# Patient Record
Sex: FEMALE | Race: WHITE
[De-identification: names, ages, dates, MRNs, and addresses within clinical notes are randomized per-mention and may not be internally consistent; named-entity substitution may affect disease eponyms.]

---

## 2021-07-30 ENCOUNTER — NURSE TRIAGE (OUTPATIENT)
Dept: OTHER | Facility: CLINIC | Age: 61
End: 2021-07-30

## 2021-07-30 NOTE — TELEPHONE ENCOUNTER
Reason for Disposition   Patient sounds very sick or weak to the triager    Answer Assessment - Initial Assessment Questions  1. APPEARANCE: \"What does the rash look like? \"       Red \"welts\", \"blistery\"    2. LOCATION: \"Where is the rash located? \"        From ankles up legs to the buttocks and to her back and inside of forearms    3. NUMBER: \"How many hives are there? \"       Multiple    4. SIZE: \"How big are the hives? \" (inches, cm, compare to coins) \"Do they all look the same or is there lots of variation in shape and size? \"       All different sizes     5. ONSET: \"When did the hives begin? \" (Hours or days ago)       Started blistering last night     6. ITCHING: \"Does it itch? \" If so, ask: \"How bad is the itch? \"     - MILD: doesn't interfere with normal activities    - MODERATE-SEVERE: interferes with work, school, sleep, or other activities       It hurts more than itches    7. RECURRENT PROBLEM: \"Have you had hives before? \" If so, ask: \"When was the last time? \" and \"What happened that time? \"       Has had hives since July 9 or 10 but never this bad - was treated with prednisone in the past.    8. TRIGGERS: \"Were you exposed to any new food, plant, cosmetic product or animal just before the hives began? \"      She believes it may be peanuts b/c she popped open peanuts last night about 7pm or 8pm - then noticed hives last night. 9. OTHER SYMPTOMS: \"Do you have any other symptoms? \" (e.g., fever, tongue swelling, difficulty breathing, abdominal pain)      Used Aveeno itch cream . Took 2 benedryl this afternoon. Also took cold shower. Burning pain rated 9/10. Denies sob. But her knee joints are getting sore. No tongue swelling . No abd pain now but felt nauseated at work. Inner thighs of legs are swelling . 10. PREGNANCY: \"Is there any chance you are pregnant? \" \"When was your last menstrual period? \"        Not asked    Protocols used: HIVES-ADULT-    Brief description of triage: see above    Triage

## 2023-03-10 DIAGNOSIS — I10 ESSENTIAL HYPERTENSION: ICD-10-CM

## 2023-03-10 RX ORDER — VERAPAMIL HYDROCHLORIDE 120 MG/1
120 TABLET, FILM COATED, EXTENDED RELEASE ORAL
Qty: 90 TABLET | Refills: 3 | Status: SHIPPED | OUTPATIENT
Start: 2023-03-10 | End: 2023-04-21 | Stop reason: SDUPTHER

## 2023-03-10 RX ORDER — VERAPAMIL HYDROCHLORIDE 120 MG/1
120 TABLET, FILM COATED, EXTENDED RELEASE ORAL
COMMUNITY
Start: 2015-03-23 | End: 2023-03-10 | Stop reason: SDUPTHER

## 2023-04-12 DIAGNOSIS — G57.60 MORTON'S NEUROMA, UNSPECIFIED LATERALITY: ICD-10-CM

## 2023-04-12 DIAGNOSIS — G25.81 RESTLESS LEG: Primary | ICD-10-CM

## 2023-04-12 PROBLEM — Z11.52 ENCOUNTER FOR SCREENING LABORATORY TESTING FOR COVID-19 VIRUS: Status: ACTIVE | Noted: 2023-04-12

## 2023-04-12 PROBLEM — E66.9 OBESITY (BMI 30.0-34.9): Status: ACTIVE | Noted: 2023-04-12

## 2023-04-12 PROBLEM — E66.811 OBESITY (BMI 30.0-34.9): Status: ACTIVE | Noted: 2023-04-12

## 2023-04-12 PROBLEM — K51.90 ULCERATIVE COLITIS (MULTI): Status: ACTIVE | Noted: 2023-04-12

## 2023-04-12 PROBLEM — F41.9 ANXIETY DISORDER: Status: ACTIVE | Noted: 2023-04-12

## 2023-04-12 PROBLEM — U07.1 COVID-19: Status: ACTIVE | Noted: 2023-04-12

## 2023-04-12 PROBLEM — E78.5 HYPERLIPIDEMIA: Status: ACTIVE | Noted: 2023-04-12

## 2023-04-12 PROBLEM — G47.33 OSA (OBSTRUCTIVE SLEEP APNEA): Status: ACTIVE | Noted: 2023-04-12

## 2023-04-12 PROBLEM — K44.9 HIATAL HERNIA: Status: ACTIVE | Noted: 2023-04-12

## 2023-04-12 PROBLEM — M54.50 LOW BACK PAIN: Status: ACTIVE | Noted: 2023-04-12

## 2023-04-12 PROBLEM — K21.9 GERD (GASTROESOPHAGEAL REFLUX DISEASE): Status: ACTIVE | Noted: 2023-04-12

## 2023-04-12 PROBLEM — A63.0 GENITAL WARTS: Status: ACTIVE | Noted: 2023-04-12

## 2023-04-12 RX ORDER — CARBIDOPA AND LEVODOPA 25; 100 MG/1; MG/1
1 TABLET ORAL NIGHTLY
COMMUNITY
End: 2023-04-12 | Stop reason: SDUPTHER

## 2023-04-12 RX ORDER — CARBIDOPA AND LEVODOPA 25; 100 MG/1; MG/1
TABLET ORAL
Qty: 90 TABLET | Refills: 3 | Status: SHIPPED | OUTPATIENT
Start: 2023-04-12 | End: 2024-04-09

## 2023-04-21 DIAGNOSIS — I10 ESSENTIAL HYPERTENSION: ICD-10-CM

## 2023-04-21 RX ORDER — VERAPAMIL HYDROCHLORIDE 120 MG/1
120 TABLET, FILM COATED, EXTENDED RELEASE ORAL
Qty: 90 TABLET | Refills: 3 | Status: SHIPPED | OUTPATIENT
Start: 2023-04-21 | End: 2023-05-12 | Stop reason: SDUPTHER

## 2023-04-24 ENCOUNTER — TELEPHONE (OUTPATIENT)
Dept: PRIMARY CARE | Facility: CLINIC | Age: 63
End: 2023-04-24
Payer: COMMERCIAL

## 2023-04-24 DIAGNOSIS — R92.8 ABNORMAL MAMMOGRAM OF LEFT BREAST: Primary | ICD-10-CM

## 2023-05-12 ENCOUNTER — TELEPHONE (OUTPATIENT)
Dept: PRIMARY CARE | Facility: CLINIC | Age: 63
End: 2023-05-12
Payer: COMMERCIAL

## 2023-05-12 DIAGNOSIS — I10 ESSENTIAL HYPERTENSION: ICD-10-CM

## 2023-05-12 RX ORDER — VERAPAMIL HYDROCHLORIDE 120 MG/1
180 TABLET, FILM COATED, EXTENDED RELEASE ORAL
Qty: 135 TABLET | Refills: 0 | Status: SHIPPED | OUTPATIENT
Start: 2023-05-12 | End: 2023-06-12 | Stop reason: SDUPTHER

## 2023-05-12 NOTE — TELEPHONE ENCOUNTER
Mild noncancerous looking changes in breatsts    Will keep a close eye since her mom had breast cancer     Recheck in 6 mo

## 2023-05-16 DIAGNOSIS — M54.50 MIDLINE LOW BACK PAIN, UNSPECIFIED CHRONICITY, UNSPECIFIED WHETHER SCIATICA PRESENT: Primary | ICD-10-CM

## 2023-05-16 RX ORDER — CYCLOBENZAPRINE HCL 5 MG
1 TABLET ORAL NIGHTLY PRN
COMMUNITY
Start: 2015-10-05 | End: 2023-05-16 | Stop reason: SDUPTHER

## 2023-05-16 RX ORDER — CYCLOBENZAPRINE HCL 5 MG
TABLET ORAL
Qty: 90 TABLET | Refills: 3 | Status: SHIPPED | OUTPATIENT
Start: 2023-05-16

## 2023-05-18 NOTE — TELEPHONE ENCOUNTER
Mammo US reviewed,   order placed for dx mammo and US L breast for L breast mass 11-10-22 at [facility]   
Need a copy of the results of her most recent mammo report to know what code to place/ type of test     Please call and get if poss,    otherwise marilynn can bring us a copy    (there is no care everywhere for her and its not in community record or in [employer])    Happy to order once we have the record     
Orders were faxed this morning to [facility]  
PT was notified by [facility] it is time to schedule her 6 mo re-evaluation  of mammogram - asked that it be sent to [facility] FAX# [phone]  
Records pulled, given to Dr. Sherwood  
No

## 2023-06-02 ENCOUNTER — NURSE TRIAGE (OUTPATIENT)
Dept: OTHER | Facility: CLINIC | Age: 63
End: 2023-06-02

## 2023-06-02 NOTE — TELEPHONE ENCOUNTER
Writer called patient back to follow up on 911 disposition, NA, went to , left message to call MMO back with any further questions or needs.

## 2023-06-02 NOTE — TELEPHONE ENCOUNTER
Location of patient: [place]    Subjective: Caller states \"I'm having pain in my chest, starts at left breast and radiates to the right. \"     Current Symptoms: chest pain x 10-15 mins; HR-65-69; 02- 97% (smart watch); dizzy, sweating, left arm tingling    PMH: HTN    Denies any difficulty breathing, injury, N/V,     Onset: 15 minutes ago; sudden    Associated Symptoms: reduced activity    Pain Severity: unable to assess    Temperature: unknown    What has been tried: na    LMP: NA Pregnant: NA    Recommended disposition: Call  Now    Care advice provided, patient verbalizes understanding; denies any other questions or concerns; instructed to call back for any new or worsening symptoms. Patient/caller agrees to calling 911    This triage is a result of a call to [address] [place] [address]. Please do not respond to the triage nurse through this encounter. Any subsequent communication should be directly with the patient.     Reason for Disposition   [1] Chest pain lasts > 5 minutes AND [2] age > 40    Protocols used: Chest Pain-ADULT-

## 2023-06-12 ENCOUNTER — OFFICE VISIT (OUTPATIENT)
Dept: PRIMARY CARE | Facility: CLINIC | Age: 63
End: 2023-06-12
Payer: COMMERCIAL

## 2023-06-12 VITALS
HEIGHT: 62 IN | TEMPERATURE: 98 F | SYSTOLIC BLOOD PRESSURE: 113 MMHG | OXYGEN SATURATION: 92 % | DIASTOLIC BLOOD PRESSURE: 69 MMHG | RESPIRATION RATE: 14 BRPM | HEART RATE: 86 BPM | BODY MASS INDEX: 32.5 KG/M2 | WEIGHT: 176.6 LBS

## 2023-06-12 DIAGNOSIS — R41.3 POOR MEMORY: Primary | ICD-10-CM

## 2023-06-12 DIAGNOSIS — K51.919 ULCERATIVE COLITIS WITH COMPLICATION, UNSPECIFIED LOCATION (MULTI): ICD-10-CM

## 2023-06-12 DIAGNOSIS — Z00.00 HEALTHCARE MAINTENANCE: ICD-10-CM

## 2023-06-12 DIAGNOSIS — E66.9 OBESITY (BMI 30.0-34.9): ICD-10-CM

## 2023-06-12 DIAGNOSIS — G25.81 RESTLESS LEGS SYNDROME: ICD-10-CM

## 2023-06-12 DIAGNOSIS — I10 ESSENTIAL HYPERTENSION: ICD-10-CM

## 2023-06-12 DIAGNOSIS — F40.9 PHOBIA, UNSPECIFIED TYPE: ICD-10-CM

## 2023-06-12 PROCEDURE — 99396 PREV VISIT EST AGE 40-64: CPT | Performed by: FAMILY MEDICINE

## 2023-06-12 PROCEDURE — 1036F TOBACCO NON-USER: CPT | Performed by: FAMILY MEDICINE

## 2023-06-12 PROCEDURE — 3078F DIAST BP <80 MM HG: CPT | Performed by: FAMILY MEDICINE

## 2023-06-12 PROCEDURE — 3074F SYST BP LT 130 MM HG: CPT | Performed by: FAMILY MEDICINE

## 2023-06-12 RX ORDER — FERROUS SULFATE 325(65) MG
TABLET ORAL
COMMUNITY

## 2023-06-12 RX ORDER — RABEPRAZOLE SODIUM 20 MG/1
1 TABLET, DELAYED RELEASE ORAL DAILY
COMMUNITY
Start: 2021-07-20 | End: 2023-12-13 | Stop reason: WASHOUT

## 2023-06-12 RX ORDER — MESALAMINE 800 MG/1
TABLET, DELAYED RELEASE ORAL
COMMUNITY
Start: 2014-02-11

## 2023-06-12 RX ORDER — ZINC GLUCONATE 50 MG
TABLET ORAL
COMMUNITY

## 2023-06-12 RX ORDER — PANTOPRAZOLE SODIUM 40 MG/1
TABLET, DELAYED RELEASE ORAL
COMMUNITY
Start: 2023-04-20

## 2023-06-12 RX ORDER — ACETAMINOPHEN 500 MG
TABLET ORAL
COMMUNITY

## 2023-06-12 RX ORDER — CITALOPRAM 20 MG/1
1 TABLET, FILM COATED ORAL DAILY
COMMUNITY
Start: 2014-03-31 | End: 2023-11-01 | Stop reason: SDUPTHER

## 2023-06-12 RX ORDER — VERAPAMIL HYDROCHLORIDE 180 MG/1
180 TABLET, FILM COATED, EXTENDED RELEASE ORAL
Qty: 90 TABLET | Refills: 3 | Status: SHIPPED | OUTPATIENT
Start: 2023-06-12 | End: 2024-05-20

## 2023-06-12 RX ORDER — CYCLOSPORINE 0 G/ML
SOLUTION/ DROPS OPHTHALMIC; TOPICAL
COMMUNITY
Start: 2022-04-12 | End: 2023-12-13 | Stop reason: WASHOUT

## 2023-06-12 ASSESSMENT — SLU MENTAL STATUS EXAMINATION (SLUMS)
BACKWARD DIGIT SPAN: 1
PICK OUT TRIANGLE: 2
WHAT STATE ARE WE IN: 1
DRAW A CLOCK: 4
WHAT YEAR IS THIS: 1
QUESTIONS ABOUT STORY: 2
WHAT DAY OF THE WEEK IS TODAY: 1
CALCULATE MONEY SPENT AND MONEY LEFT: 3
PROVIDE NAMES OF ANIMALS: 3
PATIENT HAS COMPLETED HIGH SCHOOL OR ABOVE: YES

## 2023-06-12 NOTE — PROGRESS NOTES
"Subjective   Patient ID: Coty Vidal is a 62 y.o. female who presents for annual physical/wellness visit.    She signed document informing that if problem issues also address at today's wellness visit that insurance may be appropriately billed so co-pay and deductible out of pocket expenses may occur.    Colorectal cancer screen: 11/11/2023  Mammogram: 04/26/2023 US done in 05/2023   Pap: 03/15/2023   Last Menstrual Period: unknow-jacquelyn   Tdap: 10/1/2020  HIV screen:  Hepatitis C screen:  Hepatitis B vaccine:    HPI     Here for cpe      See info above for what's due     Pt very active at job      does the cooking and buying of food,   not the healthiest eater     Room for improvement in nutrition    Mood stable, depr anx doing well on celexa     Rls doing well on sinemet         Review of Systems    Objective   /69 (BP Location: Right arm, Patient Position: Sitting, BP Cuff Size: Adult)   Pulse 86   Temp 36.7 °C (98 °F) (Temporal)   Resp 14   Ht 1.562 m (5' 1.5\")   Wt 80.1 kg (176 lb 9.6 oz)   LMP  (LMP Unknown)   SpO2 92%   BMI 32.83 kg/m²     Physical Exam  Constitutional:       General: She is not in acute distress.     Appearance: Normal appearance.   Cardiovascular:      Rate and Rhythm: Normal rate and regular rhythm.      Heart sounds: Normal heart sounds.   Pulmonary:      Effort: Pulmonary effort is normal.      Breath sounds: Normal breath sounds. No wheezing, rhonchi or rales.   Abdominal:      Palpations: Abdomen is soft.      Tenderness: There is no abdominal tenderness. There is no guarding or rebound.   Musculoskeletal:      Right lower leg: No edema.      Left lower leg: No edema.   Neurological:      Mental Status: She is alert.   Psychiatric:         Mood and Affect: Mood normal.             Assessment/Plan   Problem List Items Addressed This Visit       Essential hypertension    Relevant Medications    verapamil SR (Calan-SR) 180 mg ER tablet    Anxiety disorder     Add in " "exercise,  work on a healthy diet,   see meditation and counseling rec's below          Obesity (BMI 30.0-34.9)     We recommend limitation of refined carbohydrates such as pasta, pretzels, breads, , sugar -sweetened foods or beverages, alcohol, pastries, cereals, or bagels.   We do recommend eating lots of vegetables- 5 -7 servings of veggies daily. Eat lean proteins, and some fruits.  Eat small amounts of healthy fat daily, such as a handful of almonds, walnuts, pumpkin seeds, a serving of salmon, a splash of olive oil on your salad, an avocado.       Healthy nutritional choices decrease conditions like elevated cholesterol, elevated sugars, elevated weight, elevated blood pressure, elevated inflammation.    Healthy choices can also lower risk of events such as strokes, heart attacks, and cancer.     Make most of your food intake plant- based.   Have occasional treats if you like, but try not to overindulge.     Some sort of heart-rate increasing movement or exercise is recommended 4 - 6 days per week, even if in 5 or 10 min increments  several times throughout the day.            Restless legs syndrome    Ulcerative colitis (CMS/HCC)    Poor memory - Primary     Labs, SLUMS conducted   was a 23          Relevant Orders    TSH with reflex to Free T4 if abnormal    Vitamin B12     Other Visit Diagnoses       Healthcare maintenance        Relevant Orders    Lipid Panel    Comprehensive Metabolic Panel    CBC and Auto Differential          Tips for your general wellness...;  Remember importance of daily aerobic exercise for 30minutes to help with both your physical and mental/emotional health.  ;  Take time twice a day to relax with focused breathing and relaxation.  A good source to learn \"mindfulness\" relaxation is a book \"Mindfulness for Beginners\" by Naren Gamino.  ;    Strive for healthy eating with plenty of water, fruits, vegetables, and choosing as much plant based diet as able  If do consume non plant " protein, choose fish high in omega (like salmon or cod), skinless poultry, and rarely anything from a cow  Avoid processed foods.  ;  Shop the perimeter of the grocery store  - not the inner aisles where all the boxed, bagged, and canned process non natural foods are   Avoid sugar - including fruit juices with added sugar and avoid artificial sweeteners (sucralose, aspartame).; if want to use sweetener, use stevia (natural plant based non caloric sweetener)  Recommended guided nutrition plans include Mediterranean Diet - online resources available     Get plenty of sleep nightly - 7 hours minimum;    Exercise 150min per week;    Do not use tobacco    Abstain or limit alcohol to 1-2 drinks per 24 hours    See your dentist at least yearly    Have an eye check at least every 5 years    Follow up 1 year for annual wellness checkup;

## 2023-06-25 NOTE — ASSESSMENT & PLAN NOTE
We recommend limitation of refined carbohydrates such as pasta, pretzels, breads, , sugar -sweetened foods or beverages, alcohol, pastries, cereals, or bagels.   We do recommend eating lots of vegetables- 5 -7 servings of veggies daily. Eat lean proteins, and some fruits.  Eat small amounts of healthy fat daily, such as a handful of almonds, walnuts, pumpkin seeds, a serving of salmon, a splash of olive oil on your salad, an avocado.       Healthy nutritional choices decrease conditions like elevated cholesterol, elevated sugars, elevated weight, elevated blood pressure, elevated inflammation.    Healthy choices can also lower risk of events such as strokes, heart attacks, and cancer.     Make most of your food intake plant- based.   Have occasional treats if you like, but try not to overindulge.     Some sort of heart-rate increasing movement or exercise is recommended 4 - 6 days per week, even if in 5 or 10 min increments  several times throughout the day.

## 2023-11-01 DIAGNOSIS — F40.9 PHOBIA, UNSPECIFIED TYPE: ICD-10-CM

## 2023-11-01 RX ORDER — CITALOPRAM 20 MG/1
20 TABLET, FILM COATED ORAL DAILY
Qty: 90 TABLET | Refills: 3 | Status: SHIPPED | OUTPATIENT
Start: 2023-11-01

## 2023-11-07 ENCOUNTER — TELEPHONE (OUTPATIENT)
Dept: PRIMARY CARE | Facility: CLINIC | Age: 63
End: 2023-11-07
Payer: COMMERCIAL

## 2023-11-07 DIAGNOSIS — R92.8 ABNORMAL MAMMOGRAM OF LEFT BREAST: Primary | ICD-10-CM

## 2023-11-07 NOTE — TELEPHONE ENCOUNTER
Patient requesting 6 month follow up order for:    BI US breast limited left  Order: [id number]  Impression    Probably benign LEFT breast complicated cysts.    ASSESSMENT:  Category 3 Probably benign    RECOMMENDATION:  Breast ultrasound in 6 months Left    Please fax to [facility] at [phone]

## 2023-11-13 ENCOUNTER — TELEPHONE (OUTPATIENT)
Dept: PRIMARY CARE | Facility: CLINIC | Age: 63
End: 2023-11-13
Payer: COMMERCIAL

## 2023-11-13 DIAGNOSIS — R92.8 ABNORMAL MAMMOGRAM: Primary | ICD-10-CM

## 2023-11-13 DIAGNOSIS — E78.5 HYPERLIPIDEMIA, UNSPECIFIED HYPERLIPIDEMIA TYPE: ICD-10-CM

## 2023-11-13 DIAGNOSIS — I10 ESSENTIAL HYPERTENSION: Primary | ICD-10-CM

## 2023-11-13 RX ORDER — ATORVASTATIN CALCIUM 40 MG/1
40 TABLET, FILM COATED ORAL DAILY
Qty: 90 TABLET | Refills: 0 | Status: SHIPPED | OUTPATIENT
Start: 2023-11-13 | End: 2024-03-06

## 2023-11-13 RX ORDER — METOPROLOL SUCCINATE 25 MG/1
25 TABLET, EXTENDED RELEASE ORAL DAILY
Qty: 90 TABLET | Refills: 0 | Status: SHIPPED | OUTPATIENT
Start: 2023-11-13 | End: 2024-03-06

## 2023-11-13 NOTE — TELEPHONE ENCOUNTER
[facility] is calling, this patient is going in tomorrow for a breast ultrasound and they are requesting an order for a Bilateral Diagnostic Mammogram for an abnormal mammogram follow up be placed so the patient can have this done as well.     Please Fax to [facility] [phone]

## 2023-11-13 NOTE — TELEPHONE ENCOUNTER
Spoke with patient, she is not sure if she is supposed to be on these or not,   Would like to discuss with you on 12/16, only 3 months up loaded

## 2023-11-13 NOTE — TELEPHONE ENCOUNTER
Pt informed and verbalized understanding. This order was faxed to [facility] for pt.

## 2023-12-05 ENCOUNTER — LAB (OUTPATIENT)
Dept: LAB | Facility: LAB | Age: 63
End: 2023-12-05
Payer: COMMERCIAL

## 2023-12-05 DIAGNOSIS — R41.3 POOR MEMORY: ICD-10-CM

## 2023-12-05 DIAGNOSIS — Z00.00 HEALTHCARE MAINTENANCE: ICD-10-CM

## 2023-12-05 PROCEDURE — 85025 COMPLETE CBC W/AUTO DIFF WBC: CPT

## 2023-12-05 PROCEDURE — 80061 LIPID PANEL: CPT

## 2023-12-05 PROCEDURE — 36415 COLL VENOUS BLD VENIPUNCTURE: CPT

## 2023-12-05 PROCEDURE — 82607 VITAMIN B-12: CPT

## 2023-12-05 PROCEDURE — 84443 ASSAY THYROID STIM HORMONE: CPT

## 2023-12-05 PROCEDURE — 80053 COMPREHEN METABOLIC PANEL: CPT

## 2023-12-06 LAB
ALBUMIN SERPL BCP-MCNC: 4.5 G/DL (ref 3.4–5)
ALP SERPL-CCNC: 95 U/L (ref 33–136)
ALT SERPL W P-5'-P-CCNC: 27 U/L (ref 7–45)
ANION GAP SERPL CALC-SCNC: 15 MMOL/L (ref 10–20)
AST SERPL W P-5'-P-CCNC: 20 U/L (ref 9–39)
BASOPHILS # BLD AUTO: 0.15 X10*3/UL (ref 0–0.1)
BASOPHILS NFR BLD AUTO: 2.1 %
BILIRUB SERPL-MCNC: 0.5 MG/DL (ref 0–1.2)
BUN SERPL-MCNC: 19 MG/DL (ref 6–23)
CALCIUM SERPL-MCNC: 9.7 MG/DL (ref 8.6–10.6)
CHLORIDE SERPL-SCNC: 103 MMOL/L (ref 98–107)
CHOLEST SERPL-MCNC: 146 MG/DL (ref 0–199)
CHOLESTEROL/HDL RATIO: 2.8
CO2 SERPL-SCNC: 29 MMOL/L (ref 21–32)
CREAT SERPL-MCNC: 0.72 MG/DL (ref 0.5–1.05)
EOSINOPHIL # BLD AUTO: 0.42 X10*3/UL (ref 0–0.7)
EOSINOPHIL NFR BLD AUTO: 5.8 %
ERYTHROCYTE [DISTWIDTH] IN BLOOD BY AUTOMATED COUNT: 12.6 % (ref 11.5–14.5)
GFR SERPL CREATININE-BSD FRML MDRD: >90 ML/MIN/1.73M*2
GLUCOSE SERPL-MCNC: 84 MG/DL (ref 74–99)
HCT VFR BLD AUTO: 41.7 % (ref 36–46)
HDLC SERPL-MCNC: 52.9 MG/DL
HGB BLD-MCNC: 13.5 G/DL (ref 12–16)
IMM GRANULOCYTES # BLD AUTO: 0.02 X10*3/UL (ref 0–0.7)
IMM GRANULOCYTES NFR BLD AUTO: 0.3 % (ref 0–0.9)
LDLC SERPL CALC-MCNC: 53 MG/DL
LYMPHOCYTES # BLD AUTO: 2.27 X10*3/UL (ref 1.2–4.8)
LYMPHOCYTES NFR BLD AUTO: 31.4 %
MCH RBC QN AUTO: 29.7 PG (ref 26–34)
MCHC RBC AUTO-ENTMCNC: 32.4 G/DL (ref 32–36)
MCV RBC AUTO: 92 FL (ref 80–100)
MONOCYTES # BLD AUTO: 0.55 X10*3/UL (ref 0.1–1)
MONOCYTES NFR BLD AUTO: 7.6 %
NEUTROPHILS # BLD AUTO: 3.81 X10*3/UL (ref 1.2–7.7)
NEUTROPHILS NFR BLD AUTO: 52.8 %
NON HDL CHOLESTEROL: 93 MG/DL (ref 0–149)
NRBC BLD-RTO: 0 /100 WBCS (ref 0–0)
PLATELET # BLD AUTO: 306 X10*3/UL (ref 150–450)
POTASSIUM SERPL-SCNC: 4.4 MMOL/L (ref 3.5–5.3)
PROT SERPL-MCNC: 7.1 G/DL (ref 6.4–8.2)
RBC # BLD AUTO: 4.54 X10*6/UL (ref 4–5.2)
SODIUM SERPL-SCNC: 143 MMOL/L (ref 136–145)
TRIGL SERPL-MCNC: 200 MG/DL (ref 0–149)
TSH SERPL-ACNC: 1.49 MIU/L (ref 0.44–3.98)
VIT B12 SERPL-MCNC: 390 PG/ML (ref 211–911)
VLDL: 40 MG/DL (ref 0–40)
WBC # BLD AUTO: 7.2 X10*3/UL (ref 4.4–11.3)

## 2023-12-12 NOTE — PROGRESS NOTES
Subjective   Patient ID: Coty Vidal is a 63 y.o. female who presents for Follow-up (Pt presents for 6 month follow up-pt states no other concerns at this time.).    HPI     Patient presents today for routine 6 month follow-up.  Patient is doing well overall, they have no new concerns or issues.     Still has ongoing left knee pain, hx of dislocation.  She has completed 6+ weeks of PT, did help some.  Still unable to kneel and recline on left knee.  Not doing her PT exercises independently.  Not skiing, afraid of something happening.    ROUTINE VISIT  CHRONIC CONDITIONS:   -MOOD  Taking Celexa 20 mg daily.  Stable.  No side effects reported.     -HTN  Current regimen:  Verapamil 120 mg daily.   Metoprolol 25 mg daily.    Medications are being taken and tolerated well.   No side effects are reported.  Patient denies chest pain, shortness of breath with exertion, palpitations, lower extremity edema, headache, or dizziness.     -HLD/CAD  Taking Atorvastatin 40 mg daily, started 6/2023 7/2023 Stress test: stress echocardiography positive for ischemia in the LAD territory.    8/2023 heart cath with no intervention needed.    12/2023 TC/HDL ratio 2.8, HDL 52, LDL 52, TRIG 200    Medication(s) are being taken as directed and tolerated well.   No side effects reported.  Patient denies any facial droop, sudden vision loss, weakness or numbness on one side of body.   Patient denies chest pain, shortness of breath with exertion, palpitations, or symptoms of claudication.      -UC  Followed by GI, Dr. Leon  Taking Mesalamine 800 mg up to 2 tablets 3 times daily.  4/2021 cscope    GI recommended immunosuppressive  She is not having active flares  States has been under pretty good control.       Review of Systems   All other systems reviewed and are negative.      Objective   /67 (BP Location: Right arm, Patient Position: Sitting, BP Cuff Size: Small adult)   Pulse 69   Temp 36.2 °C (97.2 °F) (Temporal)   Resp 14    Wt 82.5 kg (181 lb 12.8 oz)   LMP  (LMP Unknown)   SpO2 94%   BMI 33.79 kg/m²     Physical Exam  Constitutional:       General: She is not in acute distress.     Appearance: Normal appearance.   Cardiovascular:      Rate and Rhythm: Normal rate and regular rhythm.      Heart sounds: Normal heart sounds.   Pulmonary:      Effort: Pulmonary effort is normal.      Breath sounds: Normal breath sounds. No wheezing, rhonchi or rales.   Abdominal:      Palpations: Abdomen is soft.      Tenderness: There is no abdominal tenderness. There is no guarding or rebound.   Musculoskeletal:      Right lower leg: No edema.      Left lower leg: No edema.      Comments: Heberden's node index finger, right hand   Neurological:      Mental Status: She is alert.   Psychiatric:         Mood and Affect: Mood normal.         Assessment/Plan   Problem List Items Addressed This Visit             ICD-10-CM    Essential hypertension - Primary I10     BP is 105/67 in office today, good control.  Goal BP is 130/80 or less, ideally 120/80 or less.   Continue present medications.    Current regimen:  Verapamil 120 mg daily.   Metoprolol 25 mg daily.         Anxiety disorder F41.9     Stable, well-controlled on Celexa 20 mg daily, continue as prescribed.  Diet and exercise recommendations revisited.   Follow-up in 6 months for recheck.         Hyperlipidemia E78.5     12/2023 TC/HDL ratio 2.8, HDL 52, LDL 52, TRIG 200  Goal TC/HDL ratio 3.4 or less, LDL 99 or less,  or less, and TRIG 150 or less.     Continue Atorvastatin 40 mg daily.  Diet and exercise recommendations revisited.     To lower this with lifestyle measures:  -make sure you are avoiding refined carbs such as breads, pasta, cereal, candy, soda,  nutrition bars, granola, chips, and sugar sweetened beverages.      -eat 5- 7 servings daily of veggies,  healthy protein such as chicken, fish,  beans, and eggs, and include healthy fats in your diet such as seeds, nuts, olive oil,  avocados, and salmon.   -exercise 4 - 6 days per week as you are able, 150 minutes total weekly divided up is recommended.  -consider taking the fiber product psyllium capsules or powder 2 times daily (generic brand is fine) , or a brand name psyllium such as [place] [facility] or Metamucil.  -consider also adding plant stanols and sterols such as Nature Made CholestOff, available over the counter.          Ulcerative colitis (CMS/[facility]) K51.90     Followed by GI, Dr. Leon  Prescribed Mesalamine 800 mg up to 2 tablets 3 times daily.    If not having active or regular flares I do not feel an immunosuppressive would be beneficial. Advised that I do concur with GYN these medications can cause flare of other conditions such as HSV. Can further discuss immunosuppressives with GI if wishes to pursue, consider conservative measures such as going gluten free if so inclined.    I recommend going gluten free x 30 days, can use celiac.org as resource on gluten free info.    Diet and exercise recommendations revisited.   -make sure you are avoiding refined carbs such as breads, pasta, cereal, candy, soda,  nutrition bars, granola, chips, and sugar sweetened beverages.      -eat 5- 7 servings daily of veggies,  healthy protein such as chicken, fish,  beans, and eggs, and include healthy fats in your diet such as seeds, nuts, olive oil, avocados, and salmon.   -exercise 4 - 6 days per week as you are able, 150 minutes total weekly         Mild CAD I25.10     12/2023 TC/HDL ratio 2.8, HDL 52, LDL 52, TRIG 200  Goal TC/HDL ratio 3.4 or less, LDL 99 or less,  or less, and TRIG 150 or less.     Continue Atorvastatin 40 mg daily.  Diet and exercise recommendations revisited.     To lower this with lifestyle measures:  -make sure you are avoiding refined carbs such as breads, pasta, cereal, candy, soda,  nutrition bars, granola, chips, and sugar sweetened beverages.      -eat 5- 7 servings daily of veggies,  healthy protein such as  chicken, fish,  beans, and eggs, and include healthy fats in your diet such as seeds, nuts, olive oil, avocados, and salmon.   -exercise 4 - 6 days per week as you are able, 150 minutes total weekly divided up is recommended.  -consider taking the fiber product psyllium capsules or powder 2 times daily (generic brand is fine) , or a brand name psyllium such as [place] [facility] or Metamucil.  -consider also adding plant stanols and sterols such as Nature Made CholestOff, available over the counter.          Left knee pain M25.562     Hx of dislocation, not fully improved with 6+ weeks physical therapy.  Still having some limitations/pain, would like ortho eval.  Knee ortho referral placed today, plans to go [facility].         Relevant Orders    Referral to Orthopaedic Surgery     Other Visit Diagnoses         Codes    Immunization due     Z23    Relevant Orders    Flu vaccine (IIV4) age 6 months and greater, preservative free (Completed)            Follow-up in 6 months for routine care.  Call for sooner follow-up if needed.     Scribe Attestation  By signing my name below, IRebeka Scribe   attest that this documentation has been prepared under the direction and in the presence of Alejandra Sherwood DO.

## 2023-12-13 ENCOUNTER — OFFICE VISIT (OUTPATIENT)
Dept: PRIMARY CARE | Facility: CLINIC | Age: 63
End: 2023-12-13
Payer: COMMERCIAL

## 2023-12-13 VITALS
DIASTOLIC BLOOD PRESSURE: 67 MMHG | WEIGHT: 181.8 LBS | OXYGEN SATURATION: 94 % | BODY MASS INDEX: 33.79 KG/M2 | RESPIRATION RATE: 14 BRPM | SYSTOLIC BLOOD PRESSURE: 105 MMHG | HEART RATE: 69 BPM | TEMPERATURE: 97.2 F

## 2023-12-13 DIAGNOSIS — F41.9 ANXIETY DISORDER, UNSPECIFIED TYPE: ICD-10-CM

## 2023-12-13 DIAGNOSIS — E78.5 HYPERLIPIDEMIA, UNSPECIFIED HYPERLIPIDEMIA TYPE: ICD-10-CM

## 2023-12-13 DIAGNOSIS — I10 ESSENTIAL HYPERTENSION: Primary | ICD-10-CM

## 2023-12-13 DIAGNOSIS — I25.10 MILD CAD: ICD-10-CM

## 2023-12-13 DIAGNOSIS — M25.562 LEFT KNEE PAIN, UNSPECIFIED CHRONICITY: ICD-10-CM

## 2023-12-13 DIAGNOSIS — Z23 IMMUNIZATION DUE: ICD-10-CM

## 2023-12-13 DIAGNOSIS — K51.919 ULCERATIVE COLITIS WITH COMPLICATION, UNSPECIFIED LOCATION (MULTI): ICD-10-CM

## 2023-12-13 PROBLEM — Z11.52 ENCOUNTER FOR SCREENING LABORATORY TESTING FOR COVID-19 VIRUS: Status: RESOLVED | Noted: 2023-04-12 | Resolved: 2023-12-13

## 2023-12-13 PROBLEM — U07.1 COVID-19: Status: RESOLVED | Noted: 2023-04-12 | Resolved: 2023-12-13

## 2023-12-13 PROCEDURE — 90471 IMMUNIZATION ADMIN: CPT | Performed by: FAMILY MEDICINE

## 2023-12-13 PROCEDURE — 99214 OFFICE O/P EST MOD 30 MIN: CPT | Performed by: FAMILY MEDICINE

## 2023-12-13 PROCEDURE — 90686 IIV4 VACC NO PRSV 0.5 ML IM: CPT | Performed by: FAMILY MEDICINE

## 2023-12-13 PROCEDURE — 1036F TOBACCO NON-USER: CPT | Performed by: FAMILY MEDICINE

## 2023-12-13 PROCEDURE — 3078F DIAST BP <80 MM HG: CPT | Performed by: FAMILY MEDICINE

## 2023-12-13 PROCEDURE — 3074F SYST BP LT 130 MM HG: CPT | Performed by: FAMILY MEDICINE

## 2023-12-13 RX ORDER — ESTRADIOL 10 UG/1
INSERT VAGINAL
COMMUNITY
Start: 2023-05-30

## 2023-12-13 RX ORDER — ASPIRIN 81 MG/1
81 TABLET ORAL
COMMUNITY
Start: 2023-07-25

## 2023-12-13 RX ORDER — EVENING PRIMROSE OIL 500 MG
CAPSULE ORAL DAILY
COMMUNITY

## 2023-12-13 RX ORDER — MAGNESIUM 200 MG
TABLET ORAL
COMMUNITY

## 2023-12-13 NOTE — ASSESSMENT & PLAN NOTE
12/2023 TC/HDL ratio 2.8, HDL 52, LDL 52, TRIG 200  Goal TC/HDL ratio 3.4 or less, LDL 99 or less,  or less, and TRIG 150 or less.     Continue Atorvastatin 40 mg daily.  Diet and exercise recommendations revisited.     To lower this with lifestyle measures:  -make sure you are avoiding refined carbs such as breads, pasta, cereal, candy, soda,  nutrition bars, granola, chips, and sugar sweetened beverages.      -eat 5- 7 servings daily of veggies,  healthy protein such as chicken, fish,  beans, and eggs, and include healthy fats in your diet such as seeds, nuts, olive oil, avocados, and salmon.   -exercise 4 - 6 days per week as you are able, 150 minutes total weekly divided up is recommended.  -consider taking the fiber product psyllium capsules or powder 2 times daily (generic brand is fine) , or a brand name psyllium such as [facility] or Metamucil.  -consider also adding plant stanols and sterols such as Nature Made CholestOff, available over the counter.

## 2023-12-13 NOTE — ASSESSMENT & PLAN NOTE
Stable, well-controlled on Celexa 20 mg daily, continue as prescribed.  Diet and exercise recommendations revisited.   Follow-up in 6 months for recheck.

## 2023-12-13 NOTE — ASSESSMENT & PLAN NOTE
Followed by GI, Dr. Leon  Prescribed Mesalamine 800 mg up to 2 tablets 3 times daily.    If not having active or regular flares I do not feel an immunosuppressive would be beneficial. Advised that I do concur with GYN these medications can cause flare of other conditions such as HSV. Can further discuss immunosuppressives with GI if wishes to pursue, consider conservative measures such as going gluten free if so inclined.    I recommend going gluten free x 30 days, can use celiac.org as resource on gluten free info.    Diet and exercise recommendations revisited.   -make sure you are avoiding refined carbs such as breads, pasta, cereal, candy, soda,  nutrition bars, granola, chips, and sugar sweetened beverages.      -eat 5- 7 servings daily of veggies,  healthy protein such as chicken, fish,  beans, and eggs, and include healthy fats in your diet such as seeds, nuts, olive oil, avocados, and salmon.   -exercise 4 - 6 days per week as you are able, 150 minutes total weekly

## 2023-12-13 NOTE — ASSESSMENT & PLAN NOTE
Hx of dislocation, not fully improved with 6+ weeks physical therapy.  Still having some limitations/pain, would like ortho eval.  Knee ortho referral placed today, plans to go to [facility].

## 2023-12-13 NOTE — ASSESSMENT & PLAN NOTE
12/2023 TC/HDL ratio 2.8, HDL 52, LDL 52, TRIG 200  Goal TC/HDL ratio 3.4 or less, LDL 99 or less,  or less, and TRIG 150 or less.     Continue Atorvastatin 40 mg daily.  Diet and exercise recommendations revisited.     To lower this with lifestyle measures:  -make sure you are avoiding refined carbs such as breads, pasta, cereal, candy, soda,  nutrition bars, granola, chips, and sugar sweetened beverages.      -eat 5- 7 servings daily of veggies,  healthy protein such as chicken, fish,  beans, and eggs, and include healthy fats in your diet such as seeds, nuts, olive oil, avocados, and salmon.   -exercise 4 - 6 days per week as you are able, 150 minutes total weekly divided up is recommended.  -consider taking the fiber product psyllium capsules or powder 2 times daily (generic brand is fine) , or a brand name psyllium such as [place] [facility] or Metamucil.  -consider also adding plant stanols and sterols such as Nature Made CholestOff, available over the counter.

## 2023-12-13 NOTE — ASSESSMENT & PLAN NOTE
BP is 105/67 in office today, good control.  Goal BP is 130/80 or less, ideally 120/80 or less.   Continue present medications.    Current regimen:  Verapamil 120 mg daily.   Metoprolol 25 mg daily.

## 2023-12-17 ENCOUNTER — TELEPHONE (OUTPATIENT)
Dept: PRIMARY CARE | Facility: CLINIC | Age: 63
End: 2023-12-17
Payer: COMMERCIAL

## 2023-12-17 DIAGNOSIS — N63.20 MASS OF LEFT BREAST, UNSPECIFIED QUADRANT: Primary | ICD-10-CM

## 2023-12-17 NOTE — TELEPHONE ENCOUNTER
Pls call pt recent left  breast US showed stable benign appearing findings     Recommend 1 more 6 mo US  on left side     Order has been placed       Overall reassuring

## 2024-01-18 DIAGNOSIS — B37.31 YEAST VAGINITIS: Primary | ICD-10-CM

## 2024-01-18 RX ORDER — FLUCONAZOLE 150 MG/1
TABLET ORAL
Qty: 1 TABLET | Refills: 1 | Status: SHIPPED | OUTPATIENT
Start: 2024-01-18

## 2024-01-19 ENCOUNTER — APPOINTMENT (OUTPATIENT)
Dept: PRIMARY CARE | Facility: CLINIC | Age: 64
End: 2024-01-19
Payer: COMMERCIAL

## 2024-02-16 ENCOUNTER — HOSPITAL ENCOUNTER (OUTPATIENT)
Dept: RADIOLOGY | Facility: CLINIC | Age: 64
Discharge: HOME | End: 2024-02-16
Payer: COMMERCIAL

## 2024-02-16 ENCOUNTER — OFFICE VISIT (OUTPATIENT)
Dept: ORTHOPEDIC SURGERY | Facility: CLINIC | Age: 64
End: 2024-02-16
Payer: COMMERCIAL

## 2024-02-16 DIAGNOSIS — M17.12 ARTHRITIS OF LEFT KNEE: ICD-10-CM

## 2024-02-16 DIAGNOSIS — M25.562 LEFT KNEE PAIN, UNSPECIFIED CHRONICITY: ICD-10-CM

## 2024-02-16 PROCEDURE — 73564 X-RAY EXAM KNEE 4 OR MORE: CPT | Mod: LT

## 2024-02-16 PROCEDURE — 1036F TOBACCO NON-USER: CPT | Performed by: ORTHOPAEDIC SURGERY

## 2024-02-16 PROCEDURE — 73564 X-RAY EXAM KNEE 4 OR MORE: CPT | Mod: LEFT SIDE | Performed by: RADIOLOGY

## 2024-02-16 PROCEDURE — 99203 OFFICE O/P NEW LOW 30 MIN: CPT | Performed by: ORTHOPAEDIC SURGERY

## 2024-02-16 NOTE — PROGRESS NOTES
Patient is a 63-year-old female presents today for evaluation of left knee pain.  She does skiing injury a year ago at last.  She had some physical therapy.  She was not seen by an orthopedic surgeon.  She denies any mechanical symptoms in terms of locking or catching.  She has some areas of soft tissue pain around the knee.  She is never had surgery in the knee.  One of her complaints is that she cannot sit on the floor and touch her about to her calf.    Left knee:  AAOx3, NAD, walks with a non-antalgic gait  Neutral allignment  Range of motion 0-110  Stable to varus/valgus/anterior/posterior stress through out the range of motion  No no joint line tenderness to palpation  No effusion  SILT in a brynn/saph/per/tib distribution  5/5 knee extension/df/pf/ehl  ½ dorsalis pedis and posterior tibial pulse  no popliteal lymphadenopathy  no other overlying lesions  mood: euthymic  Respirations non labored    Plain films were reviewed by myself in clinic today.  She is some mild medial joint space narrowing otherwise negative for osseous or soft tissue abnormality.    I discussed with her today that I do not think she is done any catastrophic to her knee.  She may not gain full range of motion but most people cannot get into that position regardless.  Will get her into some further physical therapy.  She can use an over-the-counter anti-inflammatory or Voltaren gel for her soft tissue pain.  She does not require anything surgical at this time.  All of her questions were answered.  She will follow-up on an as-needed basis.    This note was created using voice recognition software and was not corrected for typographical or grammatical errors.

## 2024-03-06 DIAGNOSIS — E78.5 HYPERLIPIDEMIA, UNSPECIFIED HYPERLIPIDEMIA TYPE: ICD-10-CM

## 2024-03-06 DIAGNOSIS — I10 ESSENTIAL HYPERTENSION: ICD-10-CM

## 2024-03-06 RX ORDER — ATORVASTATIN CALCIUM 40 MG/1
40 TABLET, FILM COATED ORAL DAILY
Qty: 90 TABLET | Refills: 1 | Status: SHIPPED | OUTPATIENT
Start: 2024-03-06

## 2024-03-06 RX ORDER — METOPROLOL SUCCINATE 25 MG/1
25 TABLET, EXTENDED RELEASE ORAL DAILY
Qty: 90 TABLET | Refills: 1 | Status: SHIPPED | OUTPATIENT
Start: 2024-03-06

## 2024-03-12 ENCOUNTER — TELEPHONE (OUTPATIENT)
Dept: PRIMARY CARE | Facility: CLINIC | Age: 64
End: 2024-03-12
Payer: COMMERCIAL

## 2024-03-12 NOTE — TELEPHONE ENCOUNTER
Patient states she has had a nose bleed for an hour now, also spit up a mouthful of blood as well    She is asking what we advise she do

## 2024-03-12 NOTE — TELEPHONE ENCOUNTER
"Per Dr. Sherwood (through chat)     \"have her sit down and lean a bit forward at the waist      pinch both nostrils shut for FIFTEEN MINUTES IN THIS POSITION (can put elbows/chest on table to support weight)       if doesn't stop to ER      if stops but then restarts do the same for 30 min      if restarts or doesn't stop to ER            if she has any spray decongestant (not nasal steroid like flonase/nasacort but a vasoconstrictor like Afrin ) have her do 2 squirts in each nostril before starting the 15 min of pinching \"    Patient is aware of recommendations      "

## 2024-04-01 ENCOUNTER — TELEPHONE (OUTPATIENT)
Dept: PRIMARY CARE | Facility: CLINIC | Age: 64
End: 2024-04-01
Payer: COMMERCIAL

## 2024-04-01 DIAGNOSIS — B37.9 YEAST INFECTION: Primary | ICD-10-CM

## 2024-04-01 RX ORDER — FLUCONAZOLE 150 MG/1
TABLET ORAL
Qty: 2 TABLET | Refills: 1 | Status: SHIPPED | OUTPATIENT
Start: 2024-04-01

## 2024-04-02 NOTE — TELEPHONE ENCOUNTER
"----- Message from Cristiane Raman MA sent at 4/1/2024  5:05 PM EDT -----  Regarding: FW: Yeast infection  Contact: [phone]    ----- Message -----  From: Milena Vidal \"Coty\"  Sent: 4/1/2024   5:02 PM EDT  To:  [facility] [address] Clinical Support Staff  Subject: Yeast infection                                  I have a bit of irritation going on. Its a milky white in color and oatmeal/cottage cheese in texture. Also very itchy. Is there something that can be called into [facility] on [address] in [place] [phone].     Thank you,  Coty Vidal  [phone]    "

## 2024-04-09 DIAGNOSIS — G25.81 RESTLESS LEG: ICD-10-CM

## 2024-04-09 RX ORDER — CARBIDOPA AND LEVODOPA 25; 100 MG/1; MG/1
TABLET ORAL
Qty: 90 TABLET | Refills: 3 | Status: SHIPPED | OUTPATIENT
Start: 2024-04-09

## 2024-05-20 DIAGNOSIS — I10 ESSENTIAL HYPERTENSION: ICD-10-CM

## 2024-05-20 RX ORDER — VERAPAMIL HYDROCHLORIDE 180 MG/1
180 TABLET, FILM COATED, EXTENDED RELEASE ORAL DAILY
Qty: 90 TABLET | Refills: 3 | Status: SHIPPED | OUTPATIENT
Start: 2024-05-20

## 2024-05-30 ENCOUNTER — OFFICE (OUTPATIENT)
Dept: URBAN - METROPOLITAN AREA CLINIC 26 | Facility: CLINIC | Age: 64
End: 2024-05-30

## 2024-05-30 ENCOUNTER — TELEPHONE (OUTPATIENT)
Dept: PRIMARY CARE | Facility: CLINIC | Age: 64
End: 2024-05-30
Payer: COMMERCIAL

## 2024-05-30 ENCOUNTER — HOSPITAL ENCOUNTER (OUTPATIENT)
Dept: RADIOLOGY | Facility: EXTERNAL LOCATION | Age: 64
Discharge: HOME | End: 2024-05-30
Payer: COMMERCIAL

## 2024-05-30 VITALS
DIASTOLIC BLOOD PRESSURE: 81 MMHG | WEIGHT: 187 LBS | TEMPERATURE: 97.8 F | HEIGHT: 62 IN | HEART RATE: 75 BPM | SYSTOLIC BLOOD PRESSURE: 128 MMHG

## 2024-05-30 DIAGNOSIS — K51.20 ULCERATIVE (CHRONIC) PROCTITIS WITHOUT COMPLICATIONS: ICD-10-CM

## 2024-05-30 DIAGNOSIS — R05.1 ACUTE COUGH: ICD-10-CM

## 2024-05-30 DIAGNOSIS — K21.9 GASTRO-ESOPHAGEAL REFLUX DISEASE WITHOUT ESOPHAGITIS: ICD-10-CM

## 2024-05-30 PROCEDURE — 99214 OFFICE O/P EST MOD 30 MIN: CPT

## 2024-05-30 RX ORDER — MESALAMINE 800 MG/1
TABLET, DELAYED RELEASE ORAL
Qty: 540 | Refills: 3 | Status: ACTIVE

## 2024-05-30 RX ORDER — PANTOPRAZOLE 40 MG/1
TABLET, DELAYED RELEASE ORAL
Qty: 90 | Refills: 3 | Status: ACTIVE

## 2024-06-13 ENCOUNTER — APPOINTMENT (OUTPATIENT)
Dept: PRIMARY CARE | Facility: CLINIC | Age: 64
End: 2024-06-13
Payer: COMMERCIAL

## 2024-06-13 VITALS
HEART RATE: 84 BPM | OXYGEN SATURATION: 92 % | SYSTOLIC BLOOD PRESSURE: 106 MMHG | WEIGHT: 183.4 LBS | TEMPERATURE: 96.9 F | DIASTOLIC BLOOD PRESSURE: 67 MMHG | BODY MASS INDEX: 34.09 KG/M2

## 2024-06-13 DIAGNOSIS — M25.562 CHRONIC PAIN OF LEFT KNEE: ICD-10-CM

## 2024-06-13 DIAGNOSIS — G89.29 CHRONIC PAIN OF LEFT KNEE: ICD-10-CM

## 2024-06-13 DIAGNOSIS — R51.9 RECURRENT HEADACHE: ICD-10-CM

## 2024-06-13 DIAGNOSIS — M54.50 MIDLINE LOW BACK PAIN, UNSPECIFIED CHRONICITY, UNSPECIFIED WHETHER SCIATICA PRESENT: ICD-10-CM

## 2024-06-13 DIAGNOSIS — E78.5 HYPERLIPIDEMIA, UNSPECIFIED HYPERLIPIDEMIA TYPE: ICD-10-CM

## 2024-06-13 DIAGNOSIS — K21.9 GASTROESOPHAGEAL REFLUX DISEASE WITHOUT ESOPHAGITIS: ICD-10-CM

## 2024-06-13 DIAGNOSIS — I10 ESSENTIAL HYPERTENSION: Primary | ICD-10-CM

## 2024-06-13 DIAGNOSIS — K51.919 ULCERATIVE COLITIS WITH COMPLICATION, UNSPECIFIED LOCATION (MULTI): ICD-10-CM

## 2024-06-13 DIAGNOSIS — G25.81 RESTLESS LEGS SYNDROME: ICD-10-CM

## 2024-06-13 PROCEDURE — 3078F DIAST BP <80 MM HG: CPT | Performed by: FAMILY MEDICINE

## 2024-06-13 PROCEDURE — 99214 OFFICE O/P EST MOD 30 MIN: CPT | Performed by: FAMILY MEDICINE

## 2024-06-13 PROCEDURE — 1036F TOBACCO NON-USER: CPT | Performed by: FAMILY MEDICINE

## 2024-06-13 PROCEDURE — 3074F SYST BP LT 130 MM HG: CPT | Performed by: FAMILY MEDICINE

## 2024-06-13 RX ORDER — CYCLOBENZAPRINE HCL 5 MG
TABLET ORAL
Qty: 90 TABLET | Refills: 1 | Status: SHIPPED | OUTPATIENT
Start: 2024-06-13

## 2024-06-13 RX ORDER — FLUTICASONE PROPIONATE 50 MCG
SPRAY, SUSPENSION (ML) NASAL
COMMUNITY
Start: 2024-04-28

## 2024-06-13 RX ORDER — PROMETHAZINE HYDROCHLORIDE AND DEXTROMETHORPHAN HYDROBROMIDE 6.25; 15 MG/5ML; MG/5ML
SYRUP ORAL
COMMUNITY
Start: 2024-05-30 | End: 2024-06-13 | Stop reason: WASHOUT

## 2024-06-13 NOTE — PATIENT INSTRUCTIONS
Today we did a 6month check up.  Your exam is normal - vital signs are good.  I refilled 1 medication.   Follow up   6 months,   for routine check/ physical .

## 2024-06-13 NOTE — PROGRESS NOTES
" Subjective   Patient ID: Milena Vidal \"Virginia" is a 63 y.o. female who presents for Follow-up.  HPI pt Samantha    Pt here for 6 month visit.   Last visit 12/13/2023 for routine care .     Chronic conditions   Mood     HTN     Hyperlipidemia    U.Colitis  - quiet / no flare ups in the last 6 months  [facility]   GERD     Restless Legs  -  takes Sinemet      Headache s, night time-  takes Flexeril    Current Outpatient Medications   Medication Sig Dispense Refill    ascorbic acid (Vitamin C) 100 mg tablet Take 1 tablet (100 mg) by mouth once daily.      atorvastatin (Lipitor) 40 mg tablet TAKE 1 TABLET DAILY 90 tablet 1    carbidopa-levodopa (Sinemet)  mg tablet TAKE 1 TABLET DAILY AT BEDTIME 90 tablet 3    cholecalciferol (Vitamin D-3) 50 mcg (2,000 unit) capsule Take by mouth.      citalopram (CeleXA) 20 mg tablet Take 1 tablet (20 mg) by mouth once daily. 90 tablet 3    estradiol (Vagifem) 10 mcg tablet vaginal tablet INSERT 1 TABLET VAGINALLY 2 TIMES A WEEK      ferrous sulfate 325 (65 Fe) MG tablet Take by mouth.      magnesium 200 mg tablet Take by mouth.      mesalamine (Asacol) 800 mg EC tablet Take by mouth.      metoprolol succinate XL (Toprol-XL) 25 mg 24 hr tablet TAKE 1 TABLET DAILY 90 tablet 1    pantoprazole (ProtoNix) 40 mg EC tablet       verapamil SR (Calan-SR) 180 mg ER tablet TAKE 1 TABLET DAILY 90 tablet 3    vitamin E 45 mg (100 unit) capsule Take by mouth once daily.      zinc gluconate 50 mg tablet Take by mouth.      cyclobenzaprine (Flexeril) 5 mg tablet TAKE 1 TABLET AT BEDTIME AS NEEDED 90 tablet 1    fluticasone (Flonase) 50 mcg/actuation nasal spray USE ONE SPRAY NASALLY ONCE A DAY       No current facility-administered medications for this visit.       Interval Health :     Gynecology examdone today   .      Interval Changes in PMHx. PSHx, FMHx :  denies hosiptalizations,  sugeries     Concerns/Questions/ updates :    Refill of " Cyclobenzaprine. 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  Fewer headaches  .  Loves this position better.  School year  2nd shift  . Summer year day shift.     2. Stopped asa- was getting nosebleeds  - during late winter .      Review of Systems  Specialists :  Gyn ,  Cardiology (just got a letter that  he was leaving)  ,Gastroenterology  .   Not seeing a therapist, but has in the past.     Objective   /67 (BP Location: Left arm, Patient Position: Sitting, BP Cuff Size: Adult)   Pulse 84   Temp 36.1 °C (96.9 °F) (Temporal)   Wt 83.2 kg (183 lb 6.4 oz)   LMP  (LMP Unknown)   SpO2 92%   BMI 34.09 kg/m²     Physical Exam  Vitals reviewed.   Constitutional:       General: She is not in acute distress.  Cardiovascular:      Rate and Rhythm: Normal rate and regular rhythm.      Heart sounds: Normal heart sounds.   Pulmonary:      Effort: Pulmonary effort is normal.      Breath sounds: No wheezing or rales.   Neurological:      General: No focal deficit present.      Mental Status: She is alert.         Assessment/Plan   Problem List Items Addressed This Visit          Medium    Essential hypertension - Primary    Relevant Orders    Comprehensive Metabolic Panel    Lipid Panel    Vitamin D 25-Hydroxy,Total (for eval of Vitamin D levels)    CBC and Auto Differential    GERD (gastroesophageal reflux disease)    Hyperlipidemia    Left knee pain    Low back pain    Relevant Medications    cyclobenzaprine (Flexeril) 5 mg tablet    Restless legs syndrome    Relevant Orders    Comprehensive Metabolic Panel    Lipid Panel    Vitamin D 25-Hydroxy,Total (for eval of Vitamin D levels)    CBC and Auto Differential    Ulcerative colitis (Multi)     Other Visit Diagnoses       Recurrent headache        Relevant Orders    Follow Up In [facility] - PCP          HTN    Continue current regimen.     Headache, nighttime   Overall,  fewer. Med renewed     U. C  , GERD    Followup with GI specialist     HL     Continue current med    Knee pain - saw ortho  , states has exercises, just has to do them     Mood Disorder   Stable on current medication .     Followup 6 mo [facility]    JON Faulkner MD

## 2024-08-15 DIAGNOSIS — E78.5 HYPERLIPIDEMIA, UNSPECIFIED HYPERLIPIDEMIA TYPE: ICD-10-CM

## 2024-08-15 DIAGNOSIS — I10 ESSENTIAL HYPERTENSION: ICD-10-CM

## 2024-08-15 RX ORDER — METOPROLOL SUCCINATE 25 MG/1
25 TABLET, EXTENDED RELEASE ORAL DAILY
Qty: 90 TABLET | Refills: 3 | OUTPATIENT
Start: 2024-08-15

## 2024-08-15 RX ORDER — ATORVASTATIN CALCIUM 40 MG/1
40 TABLET, FILM COATED ORAL DAILY
Qty: 90 TABLET | Refills: 3 | OUTPATIENT
Start: 2024-08-15

## 2024-09-03 NOTE — TELEPHONE ENCOUNTER
Advise [facility] online visit or urgent care if we do not have appt this week 
Called and spoke with patient. Patient was informed, understanding verbalized.  
Have you been seen in our office for this illness? No    Provider Seen: [facility]    Date: 05/23    Current/Recent treatment: 5 day course of prednisone to treat productive cough    Symptoms still having/concern why believe not better: productive cough +laryngitis and sinus congestion/drainage    Confirm Pharmacy-[facility]    Confirm Allergies Accurate in chart      Symptoms: Y=Yes/N=No    Fever? N    Nasal/Sinus Drainage?Y  If yes-green, yellow, bloody? Green  Headache? N  Cough? Y  If yes, productive?  Green  Are you more short of breath than normal or difficulty breathing? N  Wheezing? N  
I cannot answer for MM - if is an open appt and not a double or over book, yes
clears

## 2024-10-07 ENCOUNTER — APPOINTMENT (OUTPATIENT)
Dept: PRIMARY CARE | Facility: CLINIC | Age: 64
End: 2024-10-07
Payer: COMMERCIAL

## 2024-10-15 DIAGNOSIS — F40.9 PHOBIA, UNSPECIFIED TYPE: ICD-10-CM

## 2024-10-15 RX ORDER — CITALOPRAM 20 MG/1
20 TABLET, FILM COATED ORAL DAILY
Qty: 90 TABLET | Refills: 3 | Status: SHIPPED | OUTPATIENT
Start: 2024-10-15

## 2024-10-31 ENCOUNTER — TELEPHONE (OUTPATIENT)
Dept: PRIMARY CARE | Facility: CLINIC | Age: 64
End: 2024-10-31
Payer: COMMERCIAL

## 2024-10-31 ENCOUNTER — HOSPITAL ENCOUNTER (OUTPATIENT)
Dept: RADIOLOGY | Facility: EXTERNAL LOCATION | Age: 64
Discharge: HOME | End: 2024-10-31

## 2024-10-31 DIAGNOSIS — Z12.31 SCREENING MAMMOGRAM FOR BREAST CANCER: ICD-10-CM

## 2024-10-31 DIAGNOSIS — R92.8 ABNORMAL MAMMOGRAM: ICD-10-CM

## 2024-10-31 DIAGNOSIS — R92.8 ABNORMAL MAMMOGRAM: Primary | ICD-10-CM

## 2024-10-31 NOTE — TELEPHONE ENCOUNTER
Patient called and asked for a (L) breast ultrasound order and for it to be sent to [facility].

## 2024-10-31 NOTE — TELEPHONE ENCOUNTER
Pt informed and verbalized understanding. I went ahead and faxed order for pt to [facility] [place].

## 2024-11-01 ENCOUNTER — HOSPITAL ENCOUNTER (OUTPATIENT)
Dept: RADIOLOGY | Facility: EXTERNAL LOCATION | Age: 64
Discharge: HOME | End: 2024-11-01

## 2024-11-01 DIAGNOSIS — Z12.31 SCREENING MAMMOGRAM FOR BREAST CANCER: ICD-10-CM

## 2024-11-01 NOTE — TELEPHONE ENCOUNTER
Pt calling said that she received a call from [facility] stating that she needed a bilateral ultrasound of her breast. Please call pt when faxed.

## 2024-11-13 ENCOUNTER — HOSPITAL ENCOUNTER (OUTPATIENT)
Dept: RADIOLOGY | Facility: EXTERNAL LOCATION | Age: 64
Discharge: HOME | End: 2024-11-13

## 2024-11-13 ENCOUNTER — TELEPHONE (OUTPATIENT)
Dept: PRIMARY CARE | Facility: CLINIC | Age: 64
End: 2024-11-13
Payer: COMMERCIAL

## 2024-11-13 DIAGNOSIS — Z12.31 SCREENING MAMMOGRAM FOR BREAST CANCER: ICD-10-CM

## 2024-11-13 DIAGNOSIS — Z12.31 SCREENING MAMMOGRAM FOR BREAST CANCER: Primary | ICD-10-CM

## 2024-11-13 NOTE — TELEPHONE ENCOUNTER
Pt would like an order for the mammogram and ultrasound to be placed and faxed to [facility] [place] once placed. Fax number for the breast center is . These orders need to be signed as well before they are faxed.

## 2024-11-14 ENCOUNTER — HOSPITAL ENCOUNTER (OUTPATIENT)
Dept: RADIOLOGY | Facility: EXTERNAL LOCATION | Age: 64
Discharge: HOME | End: 2024-11-14

## 2024-11-14 DIAGNOSIS — R92.8 ABNORMAL MAMMOGRAM: ICD-10-CM

## 2024-11-14 DIAGNOSIS — R92.8 ABNORMAL MAMMOGRAM: Primary | ICD-10-CM

## 2024-12-06 ENCOUNTER — OFFICE VISIT (OUTPATIENT)
Dept: PRIMARY CARE | Facility: CLINIC | Age: 64
End: 2024-12-06
Payer: COMMERCIAL

## 2024-12-06 VITALS
DIASTOLIC BLOOD PRESSURE: 84 MMHG | OXYGEN SATURATION: 96 % | HEART RATE: 82 BPM | TEMPERATURE: 98 F | BODY MASS INDEX: 33 KG/M2 | RESPIRATION RATE: 16 BRPM | WEIGHT: 180.4 LBS | SYSTOLIC BLOOD PRESSURE: 147 MMHG

## 2024-12-06 DIAGNOSIS — E78.5 HYPERLIPIDEMIA, UNSPECIFIED HYPERLIPIDEMIA TYPE: ICD-10-CM

## 2024-12-06 DIAGNOSIS — F41.9 ANXIETY DISORDER, UNSPECIFIED TYPE: Primary | ICD-10-CM

## 2024-12-06 PROCEDURE — 1036F TOBACCO NON-USER: CPT | Performed by: FAMILY MEDICINE

## 2024-12-06 PROCEDURE — 99213 OFFICE O/P EST LOW 20 MIN: CPT | Performed by: FAMILY MEDICINE

## 2024-12-06 PROCEDURE — 3077F SYST BP >= 140 MM HG: CPT | Performed by: FAMILY MEDICINE

## 2024-12-06 PROCEDURE — 3079F DIAST BP 80-89 MM HG: CPT | Performed by: FAMILY MEDICINE

## 2024-12-06 RX ORDER — ATORVASTATIN CALCIUM 40 MG/1
40 TABLET, FILM COATED ORAL DAILY
Qty: 90 TABLET | Refills: 1 | Status: SHIPPED | OUTPATIENT
Start: 2024-12-06

## 2024-12-06 RX ORDER — CITALOPRAM 10 MG/1
10 TABLET ORAL DAILY
Qty: 30 TABLET | Refills: 1 | Status: SHIPPED | OUTPATIENT
Start: 2024-12-06 | End: 2025-02-04

## 2024-12-06 ASSESSMENT — ENCOUNTER SYMPTOMS
NERVOUS/ANXIOUS: 1
SLEEP DISTURBANCE: 1

## 2024-12-06 NOTE — PROGRESS NOTES
Subjective   Patient ID: Coty Vidal is a 64 y.o. female who presents for Anxiety, Depression, Headache, and Diarrhea (/).    HPI   PHQ-9: 15  AMPARO-7: 12  Feels more anxious than depressed.   Trigger-work related  +fmhx (sister, brother)-both took medication  Not sleeping well-has trouble falling asleep, wakes up frequently  Reports unhealthy appetite, reports stress eating   reports she has issues relaxing  Reports chronic neck pain  Reports loose stools but not diarrhea-goes 6x/day   She is taking citalopram, uncertain if this is helping, has taken this since 2009  Did counseling in the past, but for a different reason  Work-cleans a school    Review of Systems   Psychiatric/Behavioral:  Positive for sleep disturbance. Negative for suicidal ideas. The patient is nervous/anxious.    All other systems reviewed and are negative.    Objective   /84 (BP Location: Left arm, Patient Position: Sitting, BP Cuff Size: Adult)   Pulse 82   Temp 36.7 °C (98 °F) (Temporal)   Resp 16   Wt 81.8 kg (180 lb 6.4 oz)   LMP  (LMP Unknown)   SpO2 96%   BMI 33.00 kg/m²     Physical Exam  Constitutional: Well developed, well nourished, alert and in no acute distress   Eyes: Normal external exam.  Cardiovascular: No peripheral edema.  Pulmonary: No respiratory distress  Skin: Warm, well perfused, normal skin turgor and color.   Neurologic: Cranial nerves II-XII grossly intact.   Psychiatric: Mood calm and affect normal.    Assessment/Plan   INCREASE citalopram to 30mg (take 10mg with 20mg daily)    Work excuse provided    Counseling:  Psychologist & Psychiatrist  [facility] Adult [phone]  [facility] Pediatric [phone]    [facility]   [email or web address]  [address]  [place] [address] [phone]     [facility]  [address], [place] [address]  [place] [address]  [phone]    [facility]  [address]  [place], [place] [address]  [phone]    [facility]  [email or web address]  [address]  [place] [address]  Phone [phone]    [facility]  <[email or web address]  [address] [place] [address]   Phone [phone]    [facility]  [address], [place] [address]  [phone]     [facility]   [email or web address]   [address] [place] [address]  [phone]    [facility]  [address], [place] [address]  [phone]    [facility]   [address] [place] [address]  [phone]    [facility]  <[email or web address]  [address] [facility] [address]   Phone [phone]    [facility]  <[email or web address]  [address] [place] [address]  [phone]    [facility]  <[email or web address]   [address], [place] [address]  Phone: [phone]    [facility] Psychiat[facility]   [facility]   [address]   [place] [address]  Phone: [phone]    Jay Draper MD  Child and Adolescent Psychiatry  Adult Psychiatry  Telephone: [phone] [address] [place] [address]      Follow up with PCP in 4-6 weeks for re-evaluate mood

## 2024-12-06 NOTE — LETTER
December 6, 2024     Patient: Milena Vidal   YOB: 1960   Date of Visit: 12/6/2024       To Whom It May Concern:    Milena Vidal was seen in my clinic on 12/6/2024 at 2:45 pm. Please excuse Milena for her absence from work on this day to make the appointment. Patient may return back to work on 12/16/2024.     If you have any questions or concerns, please don't hesitate to call.         Sincerely,         Linda Gamboa, DO        CC: No Recipients

## 2024-12-07 ENCOUNTER — OFFICE VISIT (OUTPATIENT)
Dept: URGENT CARE | Age: 64
End: 2024-12-07
Payer: COMMERCIAL

## 2024-12-07 VITALS
HEIGHT: 62 IN | TEMPERATURE: 98.3 F | RESPIRATION RATE: 20 BRPM | OXYGEN SATURATION: 96 % | HEART RATE: 88 BPM | BODY MASS INDEX: 33.86 KG/M2 | WEIGHT: 184 LBS | SYSTOLIC BLOOD PRESSURE: 133 MMHG | DIASTOLIC BLOOD PRESSURE: 67 MMHG

## 2024-12-07 DIAGNOSIS — J01.00 ACUTE NON-RECURRENT MAXILLARY SINUSITIS: ICD-10-CM

## 2024-12-07 DIAGNOSIS — J22 LOWER RESPIRATORY TRACT INFECTION: Primary | ICD-10-CM

## 2024-12-07 RX ORDER — BENZONATATE 100 MG/1
CAPSULE ORAL
Qty: 60 CAPSULE | Refills: 0 | Status: SHIPPED | OUTPATIENT
Start: 2024-12-07

## 2024-12-07 RX ORDER — ALBUTEROL SULFATE 90 UG/1
INHALANT RESPIRATORY (INHALATION)
Qty: 18 G | Refills: 0 | Status: SHIPPED | OUTPATIENT
Start: 2024-12-07

## 2024-12-07 RX ORDER — IBUPROFEN 600 MG/1
1 TABLET ORAL EVERY 8 HOURS PRN
COMMUNITY
Start: 2024-07-25

## 2024-12-07 RX ORDER — DOXYCYCLINE 100 MG/1
CAPSULE ORAL
Qty: 14 CAPSULE | Refills: 0 | Status: SHIPPED | OUTPATIENT
Start: 2024-12-07

## 2024-12-07 ASSESSMENT — ENCOUNTER SYMPTOMS
ALLERGIC/IMMUNOLOGIC NEGATIVE: 1
EYES NEGATIVE: 1
MUSCULOSKELETAL NEGATIVE: 1
COUGH: 1
PSYCHIATRIC NEGATIVE: 1
RHINORRHEA: 1
NEUROLOGICAL NEGATIVE: 1
CONSTITUTIONAL NEGATIVE: 1
SINUS PRESSURE: 1
GASTROINTESTINAL NEGATIVE: 1
HEMATOLOGIC/LYMPHATIC NEGATIVE: 1
ENDOCRINE NEGATIVE: 1
CARDIOVASCULAR NEGATIVE: 1
SORE THROAT: 1

## 2024-12-07 ASSESSMENT — PAIN SCALES - GENERAL: PAINLEVEL_OUTOF10: 5

## 2024-12-07 NOTE — PROGRESS NOTES
"Subjective   Patient ID: Milena Vidal \"Virginia" is a 64 y.o. female. They present today with a chief complaint of Cough.    History of Present Illness  Patient is a 65 y/o female c/o nasal congestion/drainage, sinus pressure, sore throat, hoarseness, moist cough x6 days. Patient denies symptoms of fever, chills, bodyaches, lethargy, weakness, chest pain/tightness/pressure, SOB, wheezing, N/V/D, ABD pain. No OTC medication reported to be taken.       Cough  Associated symptoms include rhinorrhea and a sore throat.       Past Medical History  Allergies as of 12/07/2024 - Reviewed 12/07/2024   Allergen Reaction Noted    Nabumetone Unknown 05/16/2023    Nitrofurantoin monohyd/m-cryst Unknown 05/16/2023    Phenylephrine-acetaminophen-gg Hives 06/13/2024       (Not in a hospital admission)       Past Medical History:   Diagnosis Date    Achilles tendinitis, left leg 10/02/2020    Achilles tendinitis of left lower extremity    Acute maxillary sinusitis, unspecified 09/23/2017    Acute maxillary sinusitis    Anxiety     Anxiety disorder, unspecified 04/18/2017    Anxiety    Arthritis     Dietary counseling and surveillance 10/25/2016    Dietary counseling    Encounter for immunization 10/20/2017    Immunization due    Encounter for immunization 10/19/2015    Immunization due    GERD (gastroesophageal reflux disease)     Hypertension 2013    Hypoxemia 02/06/2018    Hypoxia    Influenza due to other identified influenza virus with other respiratory manifestations 02/06/2018    Influenza A    Other conditions influencing health status 02/06/2018    History of cough    Other malaise 02/16/2015    Malaise and fatigue    Pain in left shoulder 10/15/2014    Left shoulder pain    Pain in leg, unspecified 08/18/2015    Leg pain    Pain in right wrist 10/19/2015    Right wrist pain    Pain in unspecified ankle and joints of unspecified foot 08/18/2015    Ankle pain    Pain in unspecified knee 08/18/2015    Knee pain    Personal " history of other diseases of the female genital tract 07/07/2017    History of vaginal discharge    Personal history of other diseases of the female genital tract 02/23/2017    History of vaginitis    Personal history of other diseases of the female genital tract 01/19/2015    History of irregular menstrual cycles    Personal history of other diseases of the nervous system and sense organs 12/28/2017    History of conjunctivitis    Personal history of other diseases of the nervous system and sense organs 02/09/2016    History of restless legs syndrome    Personal history of other diseases of the respiratory system 02/21/2017    History of acute sinusitis    Personal history of other diseases of the respiratory system 02/21/2017    History of acute bronchitis    Personal history of other diseases of urinary system 10/20/2017    History of hematuria    Personal history of other endocrine, nutritional and metabolic disease 10/27/2021    History of hypokalemia    Personal history of other specified conditions 02/03/2016    History of snoring    Personal history of other specified conditions 02/18/2015    History of nausea    Personal history of other specified conditions 03/23/2015    History of weight gain    Personal history of other specified conditions 03/23/2015    History of headache    Personal history of other specified conditions 10/20/2017    History of urinary frequency    Personal history of other specified conditions 09/24/2014    History of syncope    Personal history of urinary calculi 06/24/2017    History of renal calculi    Personal history of urinary calculi 01/17/2019    History of kidney stones    Rash and other nonspecific skin eruption 03/02/2018    Rash    Rash and other nonspecific skin eruption 03/02/2018    Rash    Snoring 12/11/2015    Snoring    Solitary pulmonary nodule 08/02/2021    Lung nodule    Tension-type headache, unspecified, not intractable 10/05/2015    Muscle tension headache     "Unspecified abnormal findings in urine     Foul smelling urine    Unspecified injury of unspecified ankle, initial encounter 2015    Injury, ankle    Unspecified injury of unspecified lower leg, initial encounter 2015    Knee injury    Vesical tenesmus 2017    Painful bladder spasm    Wheezing 2018    Wheezing symptom       Past Surgical History:   Procedure Laterality Date     SECTION, CLASSIC  2014     Section     SECTION, LOW TRANSVERSE      COLONOSCOPY  2014    Complete Colonoscopy        reports that she has never smoked. She has never used smokeless tobacco. She reports current alcohol use of about 1.0 standard drink of alcohol per week. She reports that she does not use drugs.    Review of Systems  Review of Systems   Constitutional: Negative.    HENT:  Positive for congestion, rhinorrhea, sinus pressure and sore throat.    Eyes: Negative.    Respiratory:  Positive for cough.         Occasional SOB on exertion   Cardiovascular: Negative.    Gastrointestinal: Negative.    Endocrine: Negative.    Genitourinary: Negative.    Musculoskeletal: Negative.    Skin: Negative.    Allergic/Immunologic: Negative.    Neurological: Negative.    Hematological: Negative.    Psychiatric/Behavioral: Negative.                                    Objective    Vitals:    24 1128   BP: 133/67   Pulse: 88   Resp: 20   Temp: 36.8 °C (98.3 °F)   TempSrc: Oral   SpO2: 96%   Weight: 83.5 kg (184 lb)   Height: 1.575 m (5' 2\")     No LMP recorded (lmp unknown). Patient is postmenopausal.    Physical Exam  Constitutional:       Comments: Patient A/O x4, LOC 5, calm and cooperative. Patient self-ambulatory to treatment area and is in no acute distress.    HENT:      Head: Normocephalic and atraumatic.      Right Ear: Tympanic membrane normal.      Left Ear: Tympanic membrane normal.      Nose:      Comments: Bilateral inferior turbinates edematous and erythematous with " moderate amounts of purulent drainage from nares. Bilateral maxillary sinus pressure to palpation      Mouth/Throat:      Comments: Posterior pharynx erythematous without tonsillar enlargement or exudates. Uvula midline. No petechiae to palates.   Eyes:      Extraocular Movements: Extraocular movements intact.      Conjunctiva/sclera: Conjunctivae normal.      Pupils: Pupils are equal, round, and reactive to light.   Cardiovascular:      Rate and Rhythm: Normal rate and regular rhythm.      Pulses: Normal pulses.      Heart sounds: Normal heart sounds.   Pulmonary:      Comments: Audible infrequent, moist cough present during physical examination. All lungfields clear to roomair per auscultation. Patient speaking in complete sentences without SOB or difficulty. Patient in no acute respiratory distress   Abdominal:      General: Abdomen is flat.      Palpations: Abdomen is soft.   Musculoskeletal:         General: Normal range of motion.      Cervical back: Neck supple.   Skin:     General: Skin is warm and dry.      Capillary Refill: Capillary refill takes less than 2 seconds.   Neurological:      General: No focal deficit present.      Mental Status: She is oriented to person, place, and time.   Psychiatric:         Mood and Affect: Mood normal.         Behavior: Behavior normal.         Procedures    Point of Care Test & Imaging Results from this visit  No results found for this visit on 12/07/24.   No results found.    Diagnostic study results (if any) were reviewed by MELANIE Luther.    Assessment/Plan   Allergies, medications, history, and pertinent labs/EKGs/Imaging reviewed by MELANIE Luther.     Medical Decision Making  Discussed symptom and illness trajectory with patient; patient declined CXR at time of visit. Will treat for acute sinusitis and lower respiratory tract infection with Doxycycline, Tessalon Perles, Albuterol inhaler PRN.     At time of discharge, patient was clinically  well-appearing and appropriate for outpatient management. The patient/parent/guardian was educated regarding diagnosis, supportive care, OTC and Rx medications. The patient/parent/guardian was given the opportunity to ask questions prior to discharge. They verbalized understanding of discussion of treatment plan, expected course of illness and/or injury, indications on when to return to , when to seek further evaluation in ED/call 911, and the need to follow up with PCP and/or specialist as referred. Patient/parent/guardian was provided with work/school documentation if requested. Patient stable upon discharge.     Orders and Diagnoses  Diagnoses and all orders for this visit:  Lower respiratory tract infection  -     doxycycline (Vibramycin) 100 mg capsule; Take one capsule (100mg) PO BID x7 days. Take with food  -     benzonatate (Tessalon) 100 mg capsule; Take 1-2 capsules by mouth every 8 hours as needed for cough. May cause drowsiness  -     albuterol 90 mcg/actuation inhaler; Inhale 1-2 puffs every 4-6 hours as needed for wheezing, shortness of breath. Rinse mouth after use  Acute non-recurrent maxillary sinusitis  -     doxycycline (Vibramycin) 100 mg capsule; Take one capsule (100mg) PO BID x7 days. Take with food      Medical Admin Record      Patient disposition: Home    Electronically signed by MELANIE Luther  12:14 PM

## 2024-12-12 ENCOUNTER — OFFICE VISIT (OUTPATIENT)
Dept: ORTHOPEDIC SURGERY | Facility: CLINIC | Age: 64
End: 2024-12-12
Payer: COMMERCIAL

## 2024-12-12 ENCOUNTER — HOSPITAL ENCOUNTER (OUTPATIENT)
Dept: RADIOLOGY | Facility: CLINIC | Age: 64
Discharge: HOME | End: 2024-12-12
Payer: COMMERCIAL

## 2024-12-12 VITALS — HEIGHT: 62 IN | WEIGHT: 184 LBS | BODY MASS INDEX: 33.86 KG/M2

## 2024-12-12 DIAGNOSIS — G89.29 CHRONIC PAIN OF LEFT ANKLE: ICD-10-CM

## 2024-12-12 DIAGNOSIS — M25.572 CHRONIC PAIN OF LEFT ANKLE: ICD-10-CM

## 2024-12-12 DIAGNOSIS — M76.62 LEFT ACHILLES TENDINITIS: ICD-10-CM

## 2024-12-12 PROCEDURE — 99213 OFFICE O/P EST LOW 20 MIN: CPT

## 2024-12-12 PROCEDURE — 73610 X-RAY EXAM OF ANKLE: CPT | Mod: LT

## 2024-12-12 PROCEDURE — 1036F TOBACCO NON-USER: CPT

## 2024-12-12 PROCEDURE — 3008F BODY MASS INDEX DOCD: CPT

## 2024-12-12 PROCEDURE — 99203 OFFICE O/P NEW LOW 30 MIN: CPT

## 2024-12-12 NOTE — PROGRESS NOTES
Subjective    Patient ID: Coty Vidal is a 64 y.o. female.    Chief Complaint: Pain of the Left Ankle    HPI  This is a 64-year-old female presenting to the office for evaluation of left ankle pain, which has been ongoing for 1 year.  There has been no known injury.  She points to the lateral and posterior aspect of ankle when describing the pain.  Pain is intermittent.  She has not noticed any swelling or limited range of motion.  Pain is worse with prolonged standing or walking as well as going up and down stairs.  Patient states that she was previously seen for this issue and was placed in a walking boot.  She has also performed physical therapy which was of some relief.  She prefers not to take any over-the-counter medications, but will take an ibuprofen as needed.  She will apply ice and elevate.  She is a  for the [employer]      The patient's past medical, surgical, family, and social history as well as allergies and medications were reviewed and updated in the chart.    Objective   Ortho Exam  Pleasant in no acute distress.  Walks in the office today with a normal gait.  Left ankle normal appearance without soft tissue swelling erythema or ecchymosis.  There is no warmth upon touch.  She is minimally tender upon palpation of posterior fibula peroneal tendon.  More significant tenderness upon palpation of Achilles tendon.  She has full range of motion of left foot and ankle.  She has adequate strength with resisted dorsiflexion and plantarflexion.  The ankle joint is stable, no ligament laxity.  Plan: Discussion with patient in regards to left Achilles tendinitis.    Image Results:  Multiple view x-rays of the left ankle obtained today personally reviewed, without evidence of acute fracture or dislocation.  Evidence of heel and calcaneal spurs.    Assessment/Plan   Encounter Diagnoses:  Chronic pain of left ankle    Left Achilles tendinitis    Plan: Discussion with patient in regards to  left Achilles tendinitis with review of today's x-rays.  Conservative treatment options were discussed at length.  Explained to patient that she should attend a formal physical therapy program to help with Achilles tendinitis, referral provided.  We then discussed a lace up ankle brace versus a walking boot.  I have suggested to patient that she try lace up ankle brace, the patient states that she would feel better in a walking boot.  Patient was then advised that she should be in walking boot only for 2 to 3 weeks, but should come out of boot to work on range of motion and strengthening.  Explained to patient that I do not want her ankle to become stiff.  She was advised to take ibuprofen and Tylenol as well as apply ice and elevate as needed.  She will follow-up in 5 weeks for reevaluation.    Orders Placed This Encounter    XR ankle left 3+ views    Referral to Physical Therapy

## 2024-12-13 DIAGNOSIS — M25.572 CHRONIC PAIN OF LEFT ANKLE: Primary | ICD-10-CM

## 2024-12-13 DIAGNOSIS — G89.29 CHRONIC PAIN OF LEFT ANKLE: Primary | ICD-10-CM

## 2024-12-16 ENCOUNTER — APPOINTMENT (OUTPATIENT)
Dept: PRIMARY CARE | Facility: CLINIC | Age: 64
End: 2024-12-16
Payer: COMMERCIAL

## 2025-01-13 ENCOUNTER — EVALUATION (OUTPATIENT)
Dept: PHYSICAL THERAPY | Facility: CLINIC | Age: 65
End: 2025-01-13
Payer: COMMERCIAL

## 2025-01-13 ENCOUNTER — APPOINTMENT (OUTPATIENT)
Dept: PRIMARY CARE | Facility: CLINIC | Age: 65
End: 2025-01-13
Payer: COMMERCIAL

## 2025-01-13 ENCOUNTER — OFFICE VISIT (OUTPATIENT)
Dept: ORTHOPEDIC SURGERY | Facility: CLINIC | Age: 65
End: 2025-01-13
Payer: COMMERCIAL

## 2025-01-13 VITALS
TEMPERATURE: 97.7 F | SYSTOLIC BLOOD PRESSURE: 106 MMHG | OXYGEN SATURATION: 94 % | WEIGHT: 183.6 LBS | BODY MASS INDEX: 33.58 KG/M2 | DIASTOLIC BLOOD PRESSURE: 65 MMHG | HEART RATE: 88 BPM

## 2025-01-13 DIAGNOSIS — G89.29 CHRONIC PAIN OF LEFT ANKLE: Primary | ICD-10-CM

## 2025-01-13 DIAGNOSIS — G25.81 RESTLESS LEGS SYNDROME: ICD-10-CM

## 2025-01-13 DIAGNOSIS — M79.672 LEFT FOOT PAIN: ICD-10-CM

## 2025-01-13 DIAGNOSIS — J06.9 PROTRACTED URI: ICD-10-CM

## 2025-01-13 DIAGNOSIS — M76.62 LEFT ACHILLES TENDINITIS: ICD-10-CM

## 2025-01-13 DIAGNOSIS — I10 ESSENTIAL HYPERTENSION: ICD-10-CM

## 2025-01-13 DIAGNOSIS — F41.9 ANXIETY DISORDER, UNSPECIFIED TYPE: ICD-10-CM

## 2025-01-13 DIAGNOSIS — K21.9 GASTROESOPHAGEAL REFLUX DISEASE WITHOUT ESOPHAGITIS: ICD-10-CM

## 2025-01-13 DIAGNOSIS — E66.811 CLASS 1 OBESITY WITH BODY MASS INDEX (BMI) OF 33.0 TO 33.9 IN ADULT, UNSPECIFIED OBESITY TYPE, UNSPECIFIED WHETHER SERIOUS COMORBIDITY PRESENT: ICD-10-CM

## 2025-01-13 DIAGNOSIS — M76.62 LEFT ACHILLES TENDINITIS: Primary | ICD-10-CM

## 2025-01-13 DIAGNOSIS — K51.919 ULCERATIVE COLITIS WITH COMPLICATION, UNSPECIFIED LOCATION (MULTI): ICD-10-CM

## 2025-01-13 DIAGNOSIS — M25.572 CHRONIC PAIN OF LEFT ANKLE: ICD-10-CM

## 2025-01-13 DIAGNOSIS — M25.572 CHRONIC PAIN OF LEFT ANKLE: Primary | ICD-10-CM

## 2025-01-13 DIAGNOSIS — Z00.00 HEALTHCARE MAINTENANCE: Primary | ICD-10-CM

## 2025-01-13 DIAGNOSIS — G89.29 CHRONIC PAIN OF LEFT ANKLE: ICD-10-CM

## 2025-01-13 DIAGNOSIS — I25.10 MILD CAD: ICD-10-CM

## 2025-01-13 DIAGNOSIS — E78.5 HYPERLIPIDEMIA, UNSPECIFIED HYPERLIPIDEMIA TYPE: ICD-10-CM

## 2025-01-13 PROBLEM — R41.3 POOR MEMORY: Status: RESOLVED | Noted: 2023-06-12 | Resolved: 2025-01-13

## 2025-01-13 PROBLEM — M31.0 HYPERSENSITIVITY ANGIITIS (MULTI): Status: RESOLVED | Noted: 2025-01-13 | Resolved: 2025-01-13

## 2025-01-13 PROBLEM — M31.0 HYPERSENSITIVITY ANGIITIS (MULTI): Status: ACTIVE | Noted: 2025-01-13

## 2025-01-13 PROBLEM — M54.50 LOW BACK PAIN: Status: RESOLVED | Noted: 2023-04-12 | Resolved: 2025-01-13

## 2025-01-13 PROBLEM — M25.562 LEFT KNEE PAIN: Status: RESOLVED | Noted: 2023-12-13 | Resolved: 2025-01-13

## 2025-01-13 PROCEDURE — 97161 PT EVAL LOW COMPLEX 20 MIN: CPT | Mod: GP

## 2025-01-13 PROCEDURE — 3074F SYST BP LT 130 MM HG: CPT | Performed by: FAMILY MEDICINE

## 2025-01-13 PROCEDURE — 1036F TOBACCO NON-USER: CPT

## 2025-01-13 PROCEDURE — 97110 THERAPEUTIC EXERCISES: CPT | Mod: GP

## 2025-01-13 PROCEDURE — 99213 OFFICE O/P EST LOW 20 MIN: CPT | Performed by: FAMILY MEDICINE

## 2025-01-13 PROCEDURE — 3078F DIAST BP <80 MM HG: CPT | Performed by: FAMILY MEDICINE

## 2025-01-13 PROCEDURE — 99213 OFFICE O/P EST LOW 20 MIN: CPT

## 2025-01-13 PROCEDURE — 99396 PREV VISIT EST AGE 40-64: CPT | Performed by: FAMILY MEDICINE

## 2025-01-13 PROCEDURE — 1036F TOBACCO NON-USER: CPT | Performed by: FAMILY MEDICINE

## 2025-01-13 RX ORDER — PANTOPRAZOLE SODIUM 40 MG/1
40 TABLET, DELAYED RELEASE ORAL
Qty: 90 TABLET | Refills: 1 | Status: SHIPPED | OUTPATIENT
Start: 2025-01-13

## 2025-01-13 RX ORDER — VERAPAMIL HYDROCHLORIDE 180 MG/1
180 TABLET, EXTENDED RELEASE ORAL DAILY
Qty: 90 TABLET | Refills: 1 | Status: SHIPPED | OUTPATIENT
Start: 2025-01-13

## 2025-01-13 RX ORDER — CITALOPRAM 20 MG/1
20 TABLET, FILM COATED ORAL DAILY
Qty: 90 TABLET | Refills: 1 | Status: SHIPPED | OUTPATIENT
Start: 2025-01-13

## 2025-01-13 RX ORDER — CITALOPRAM 20 MG/1
20 TABLET, FILM COATED ORAL DAILY
COMMUNITY
End: 2025-01-13 | Stop reason: SDUPTHER

## 2025-01-13 RX ORDER — ATORVASTATIN CALCIUM 40 MG/1
40 TABLET, FILM COATED ORAL DAILY
Qty: 90 TABLET | Refills: 1 | Status: SHIPPED | OUTPATIENT
Start: 2025-01-13

## 2025-01-13 ASSESSMENT — ENCOUNTER SYMPTOMS
LOSS OF SENSATION IN FEET: 0
DEPRESSION: 0
OCCASIONAL FEELINGS OF UNSTEADINESS: 0

## 2025-01-13 NOTE — ASSESSMENT & PLAN NOTE
Vaccines and screenings reviewed.  Questionnaires completed.  Health and wellness topics reviewed.  Diet and exercise recommendations revisited.  Routine blood work ordered today.     VACCINES:  -Flu vaccine deferred today  -TDAP is good until 2020  -Shingrix previously completed, good for lifetime  -Pneumonia vaccine recommended at age 65    SCREENINGS:  -Screening pap last completed 2021 (neg/neg), repeat in 5 years. Due 2026.  -Screening mammogram due. She is to get dx mammo and US done at [facility] 1/2025. Has orders already.  -Screening colonoscopy last completed in 1/2023, hx of , follow-up with GI for surveillance recommendations.  -Screening dexa recommened starting at age 65    LIFESTYLE MEASURES  -consider increasing protein intake provided no issues with kidneys to 1 gram per 1 pound of ideal body weight per not to exceed 150 gram per day. May have to supplement with a protein powder to achieve this goal.  -make sure you are avoiding refined carbs such as breads, pasta, cereal, candy, soda,  nutrition bars, granola, chips, and sugar sweetened beverages.      -eat 5- 7 servings daily of veggies,  healthy protein such as chicken, fish,  beans, and eggs, and include healthy fats in your diet such as seeds, nuts, olive oil, avocados, and salmon.   -exercise 4 - 6 days per week as you are able, 150 minutes total weekly divided up is recommended with 3-4 of those days including resistance/strength training.  -Vitamin D is recommended at 1000 - 5000 IU international units daily.   -Always wear sunscreen when you have sun exposure.  -64 oz of water is recommended daily.  -Dental visits recommended every 6 months.  -Eye exam recommended every 2 years, for those with vision problems every year.      I recommend considering starting a magesium supplement.  Different types of magnesium and their uses:    For bowels:  To help keep bowels moving (constipation prevention/treatment) magnesium oxide or magnesium citrate  (very little magnesium absorbed)     For muscle cramping, sleep, and mood:   I recommend trying the inexpensive option first, this include magnesium chloride or magnesium glycinate 200-500 mg daily (if bowels become too loose back down dose)     If using for sleep, mood, focus and mag chloride or mag glycinate do not seem to be helping:   Can try more expensive ($$$) magnesium  with best CNS (brain) penetration, for sleep, focus, mood  - magnesium L threonate ,or threonine, or Magtein - 2000 mg, between 150 and 450 mg elemental magnesium in that 2000 mg depending on brand. This can be purchased online (amazon has one brand for as little as  $30/mo).

## 2025-01-13 NOTE — ASSESSMENT & PLAN NOTE
Known history of hiatal hernia and UC  Follows with GI - Dr. Leon  Last EGD 2022    Pantoprazole refilled today  Continue to follow-up with GI as they have recommended.

## 2025-01-13 NOTE — PROGRESS NOTES
"Physical Therapy Evaluation    Patient Name: Milena Vidal \"[place]"  MRN: [id number]  Today's Date: 1/13/2025  Time Calculation  Start Time: 0915  Stop Time: 0959  Time Calculation (min): 44 min  PT Evaluation Time Entry  PT Evaluation (Low) Time Entry: 29  PT Therapeutic Procedures Time Entry  Therapeutic Exercise Time Entry: 15        Insurance: [employer] [place] [employer] [place], $20 COPAY  No authorization required by insurance after initial evaluation  Primary diagnosis: L ankle pain  Visit # 1      Assessment   Milena Vidal \"[place]" is a 64 y.o. referred for L Achilles tendonitis who presents with chronic, constant with tenderness to palpation L mid portion and insertion of Achilles, L lateral foot distal to malleoli, L plantar foot. Patient demonstrates decreased ROM, force production, as well as decreased mobility. At this time, patient is limited with stepping, stairs, household and work duties. Patient would benefit from PT interventions to address the stated impairments, improve functional mobility, establish HEP, and return toward prior level of function.       Plan   Treatment/Interventions: Cryotherapy, Education/ Instruction, Hot pack, Manual therapy, Neuromuscular re-education, Therapeutic activities, Therapeutic exercises, Gait Training  PT Plan: Skilled PT  PT Frequency: 1-2 times per week  Duration: 8 visits  Rehab Potential: Good  Plan of Care Agreement: Patient    The physical therapy prognosis is Good for the patient to achieve their goals. The patient tolerated therapy treatment today well with no adverse effects. Clinical presentation: stable. Level of clinical decision making is low.  Personal factors that impact therapy include: chronic symptoms       Current Problem  1. Chronic pain of left ankle  Follow Up In Physical Therapy      2. Left Achilles tendinitis  Referral to Physical Therapy    Follow Up In Physical Therapy      3. Left foot pain  Follow Up In Physical Therapy        Problem List " Items Addressed This Visit             ICD-10-CM    Chronic pain of left ankle - Primary M25.572, G89.29    Relevant Orders    Follow Up In Physical Therapy    Left foot pain M79.672    Relevant Orders    Follow Up In Physical Therapy    Left Achilles tendinitis M76.62    Relevant Orders    Follow Up In Physical Therapy       General:  General  Reason for Referral: M76.62 (ICD-10-CM) - Left Achilles tendinitis  Referred By: Sandy Turner, APRN-CNP  Precautions:  Precautions  STEADI Fall Risk Score (The score of 4 or more indicates an increased risk of falling): 2    Subjective:  Chief complaints: L Achilles, ankle and foot pain  Onset Date: 6 months ago  Referred for PT:  12/12/2024  Mechanism of Injury: insidious, but recalls irritation of L ankle with wheeling item at work over concrete when it gets stuck  Was in walking boot for long distances and plans to get lace up ankle brace  Previous History: Indep with one possible R ankle injury in the past, not sure  Personal Factors that may impact care: chronic symptoms   Patient is cleared for physical therapy services by physician referral. Discussed concerns on intake forms. Patient is following up with medical providers to address.    Pain:  Current: 5/10 Best: 3-4/10 Worst: 9/10   Location: L Achilles, L anterior, lateral ankle and plantar foot pain   Type: stabbing, achy   Aggravators: stepping a certain way, stairs, stepping on threshold   Alleviators: elevating   Numbness/tingling: no    Function:   Work/Recreation: , off work since 12/15/2024. Returns to work today.   Current limitations: fearful of washing floors, vacuuming    Condition: Improving     Home Situation:    Stairs: 2 flights with handrail support   Lives with     Sleep:  Preferred position(s): sidelying     Goals for Therapy:    Strengthen my ankle    Objective   Gait: Indep ambulation, decreased WB L LE, decreased heel strike and toe off gait pattern  Stair negotiation:  Reciprocal with B handrail support  Posture: decreased WB L in standing compared to R  Palpation: (+) TTP L mid portion and insertion of Achilles, L lateral foot distal to malleoli, L plantar foot  Sensation: intact to light touch B LE    Ankle & Foot AROM:  Dorsiflexion: R 10 deg, L 4 deg P! Achilles  Plantarflexion: R 45 deg, L 45 deg  Inversion: R 40 deg, R 31 deg P! R lateral foot  Eversion: R 4 deg, L 4 deg  Hallux hypomobility    Hip Strength:  Flexion: R 4/5, L 4-/5    Knee Strength:  Flexion: 4-/5 B  Extension: 4-/5 B    L Ankle & Foot Strength:  Dorsiflexion: 4-/5  Plantarflexion: 1 DL heel raise, 1 SL heel raise  Inversion: 4-/5  Eversion: 4-/5    Special Tests:  (+) royal doss  (-) arc, windlass    Outcome Measures:  Lower Extremity Functional Scale (LEFS): 46      Treatment:  Therapeutic Exercise:  Education on modification or discontinuation of any exercise if adverse reaction arises.  Supine gastroc stretch 3 x 30 sec  Supine heel slide to improve DF mobility x 15 reps  Seated plantar fascia stretch 3 x 30 sec  Seated long duration submaximal loading isometric in heel raise position H30-45 3-5 reps    OP Education: verbal, demonstration, HEP handout    Access Code: [email or web address]  URL: [email or web address]  Date: 01/13/2025  Prepared by: Cherly Guerra  - Supine Calf Stretch with Strap (Mirrored)  - 1-2 x daily - 7 x weekly - 3 reps - 30 seconds hold  - Seated Heel Slide  - 1 x daily - 7 x weekly - 2 sets - 10 reps  - Seated Plantar Fascia Stretch (Mirrored)  - 1-2 x daily - 7 x weekly - 3 reps - 30 seconds hold  - Seated Plantar Fascia Mobilization with Small Ball  - 1 x daily - 20 reps  - Seated Heel Raise  - 1-2 x daily - 3-5 reps - 30-45 seconds hold    Goals:  Active       PT Problem       Patient will report minimal to no pain on average, pain no greater than 5/10 with home and work duties       Start:  01/13/25    Expected End:  04/13/25            Patient will improve  L DF, INV ROM by 5 deg to increase ease ease with walking, stairs, work duties       Start:  01/13/25    Expected End:  04/13/25            Patient will increase strength by >1/3 MMT, heel raises 5-10 repetitions to assist with pain reduction in daily functional mobility       Start:  01/13/25    Expected End:  04/13/25            Patient will demonstrate independence and compliance with HEP and self management       Start:  01/13/25    Expected End:  04/13/25            Patient will improve LEFS score by 9 points to meet minimal detectable change of improvement to demonstrate increased functional mobility       Start:  01/13/25    Expected End:  04/13/25

## 2025-01-13 NOTE — ASSESSMENT & PLAN NOTE
12/2023 TC/HDL ratio 2.8, HDL 52, LDL 52, TRIG 200  Goal TC/HDL ratio 3.4 or less, LDL 99 or less,  or less, and TRIG 150 or less.     Not currently on statin as ran out of refills   Resume Atorvastatin 40 mg daily, previously started in 6/2023 via cardio as LDL >190  Diet and exercise recommendations revisited.   Repeat lipid panel ordered with routine labs.    To lower this with lifestyle measures:  -make sure you are avoiding refined carbs such as breads, pasta, cereal, candy, soda,  nutrition bars, granola, chips, and sugar sweetened beverages.      -eat 5- 7 servings daily of veggies,  healthy protein such as chicken, fish,  beans, and eggs, and include healthy fats in your diet such as seeds, nuts, olive oil, avocados, and salmon.   -exercise 4 - 6 days per week as you are able, 150 minutes total weekly divided up is recommended.  -consider taking the fiber product psyllium capsules or powder 2 times daily (generic brand is fine) , or a brand name psyllium such as [employer] [facility] [employer] or Metamucil.  -consider also adding plant stanols and sterols such as Nature Made CholestOff, available over the counter.

## 2025-01-13 NOTE — ASSESSMENT & PLAN NOTE
Followed by GI, Dr. Leon, last cscope in 1/2023  Prescribed Mesalamine 800 mg up to 2 tablets 3 times daily.  GI previously recommended immunosuppressive but patient deferred as she is not having active flares which is reasonable.  Return to GI for surveillance recommendations.

## 2025-01-13 NOTE — PATIENT INSTRUCTIONS
Creatine monohydrate 4 - 5 mg daily     Mag glycinate 250 - 750 nightly  (back off if diarrhea)     Lifestyle recommendations for overall wellness, as you are interested or able -     We recommend limitation of refined carbohydrates such as pasta, pretzels, chips, breads, bagels or cereals- particularly white flour containing.   Limit sugar sweetened foods or beverages, alcohol, pastries, candy, sports drinks or sodas.  (Water is best.)  Minimize diet drinks with artificial sugars.  If craving a sweetened food or beverage, two sweeteners from natural sources without substantial calories or glucose-raising properties are monk fruit extract or stevia.     Shoot for drinking 64 oz water daily unless conditions such as heart failure limit your recommended intake (Ask your doctor if you're not sure.)    Whole grain foods can be part of a healthy diet, and include such things as steel cut oats, brown rice, quinoa, millet, Rafael or other sprouted breads (often found in the freezer section), amaranth, teff, farrow., etc.  Wheat/buckwheat/spelt can be added if you are not gluten-sensitive, and if these foods don't cause you bloating or symptoms of inflammation.       We do recommend eating lots of vegetables- 5 -7 servings of veggies daily, which is good fiber source as well as vitamins and minerals. (Think egg bake with spinach, peppers, onions, for breakfast,  celery with nut butter, or hummus and cucumbers as snacks,  salads with lunch and/or dinner, 1 - 2 veggies with lunch and or dinner,  etc)    Eat lean proteins -shoot for 70 - 100 g of protein per day unless you have restrictions from kidney disease, etc.  Think hard boiled eggs, beans, seeds/lentils, cottage cheese, consider protein powder such as whey or pea powder in steel cut oats or a homemade smoothie.     Eat some healthy fat daily, such as a handful of almonds, walnuts, pumpkin seeds, a serving of salmon, a splash of olive oil on your salad, an avocado.        Healthy nutritional choices decrease conditions like elevated cholesterol, elevated sugars, elevated weight, elevated blood pressure, and elevated inflammation.    Healthy choices can also lower risk of events such as strokes, heart attacks, and cancer.     Make most of your food intake plant- based.   Have occasional treats if you like, but try not to overindulge.     Some sort of heart-rate increasing movement or exercise is recommended 4 - 6 days per week, even if in 5 or 10 min increments several times throughout the day.     Include within that exercise time some strength/ resistance exercises at least 3 days per week.  This can be through use of resistance bands, free weights, machines, body weight lifting, heavy outdoor chores such as yardwork, mulching, chopping wood, etc.   Stretching/range of motion exercises should also be included as part of your exercise time - such as yoga, john chi, or even just post- exercise stretching.  Moving exercised muscles beyond day to day usual activities can help you stay limber and flexible, decrease injury risk, and minimize aches and pains with everyday movements.     Either in combination with exercise or just as a self-care quiet practice, spending time in nature has a wide range of positive effects on your health and wellbeing, including improved mood, improved blood pressure, and improved immune function.   Even 10 - 20 minutes a few days per week can make a difference.     Consider visiting [email or web address]/naturerx     This site is a new partnership between [facility] and local physicians to include a prescription for nature as part of your self-care and wellness.      Healthy sleep ( 6 - 8 hours if possible) and  involvement in community (neighbors, family, senior center, hobby groups, jimy based realationships, etc) are also important parts of overall well being.      Pick one or two things to focus on per week or month and start building on  your wellness promoting activities.   You deserve it!     Anxiety disorder  Scales reviewed and discussed, these indicate clinical remission.  Stable on Celexa 20 mg daily, continue as prescribed.  Diet and exercise recommendations revisited.   Consider adding on magnesium supplement - see wellness a/p section.    Essential hypertension  BP is 106/65 in office today. Goal BP is 130/80 or less, ideally 120/80 or less.   Reports not taking Metoprolol which was previously started by cardio as was unable to get refills from pharmacy.  BP is well-controlled in office today, she will continue off the BB at this time.    Plan:  -Continue Verapamil 120 mg daily.   -Routine labs ordered, these are to be done prior to 6 month evaluation.  -Patient to monitor BP outside the office and if finds consistently above 130/80 would resume the Metoprolol 25 mg daily. Patient to call for prescription if needed.      Patient to check BP regularly at home and keep a diary - DO THIS 1 HOUR AFTER TAKING MEDS.   This should be taken after sitting with feet flat on floor and resting for 5 minutes.   Arm should be level with your heart.   New guidelines recommend goal for blood pressure less than 130/80.   Ideally for stroke and heart attack risk reduction the systolic, or top, blood pressure number should be in the 110's or 120's.    PLEASE BRING BP CUFF IN TO NEXT VISIT FOR VALIDATION - TAKE YOUR BP @ HOME BEFORE YOU COME!     Healthcare maintenance  Vaccines and screenings reviewed.  Questionnaires completed.  Health and wellness topics reviewed.  Diet and exercise recommendations revisited.  Routine blood work ordered today.     VACCINES:  -Flu vaccine deferred today  -TDAP is good until 2020  -Shingrix previously completed, good for lifetime  -Pneumonia vaccine recommended at age 65    SCREENINGS:  -Screening pap last completed 2021 (neg/neg), repeat in 5 years. Due 2026.  -Screening mammogram due. She is to get dx mammo and US done at  [facility] 1/2025. Has orders already.  -Screening colonoscopy last completed in 1/2023, hx of UC, follow-up with GI for surveillance recommendations.  -Screening dexa recommened starting at age 65    LIFESTYLE MEASURES  -consider increasing protein intake provided no issues with kidneys to 1 gram per 1 pound of ideal body weight per not to exceed 150 gram per day. May have to supplement with a protein powder to achieve this goal.  -make sure you are avoiding refined carbs such as breads, pasta, cereal, candy, soda,  nutrition bars, granola, chips, and sugar sweetened beverages.      -eat 5- 7 servings daily of veggies,  healthy protein such as chicken, fish,  beans, and eggs, and include healthy fats in your diet such as seeds, nuts, olive oil, avocados, and salmon.   -exercise 4 - 6 days per week as you are able, 150 minutes total weekly divided up is recommended with 3-4 of those days including resistance/strength training.  -Vitamin D is recommended at 1000 - 5000 IU international units daily.   -Always wear sunscreen when you have sun exposure.  -64 oz of water is recommended daily.  -Dental visits recommended every 6 months.  -Eye exam recommended every 2 years, for those with vision problems every year.      I recommend considering starting a magesium supplement.  Different types of magnesium and their uses:    For bowels:  To help keep bowels moving (constipation prevention/treatment) magnesium oxide or magnesium citrate (very little magnesium absorbed)     For muscle cramping, sleep, and mood:   I recommend trying the inexpensive option first, this include magnesium chloride or magnesium glycinate 200-500 mg daily (if bowels become too loose back down dose)     If using for sleep, mood, focus and mag chloride or mag glycinate do not seem to be helping:   Can try more expensive ($$$) magnesium  with best CNS (brain) penetration, for sleep, focus, mood  - magnesium L threonate ,or threonine, or Magtein - 2000 mg,  between 150 and 450 mg elemental magnesium in that 2000 mg depending on brand. This can be purchased online (amazon has one brand for as little as  $30/mo).    Mild CAD  12/2023 TC/HDL ratio 2.8, HDL 52, LDL 52, TRIG 200  Goal TC/HDL ratio 3.4 or less, LDL 99 or less,  or less, and TRIG 150 or less.     Not currently on statin as ran out of refills   Resume Atorvastatin 40 mg daily, previously started in 6/2023 via cardio as LDL >190  Diet and exercise recommendations revisited.   Repeat lipid panel ordered with routine labs.    To lower this with lifestyle measures:  -make sure you are avoiding refined carbs such as breads, pasta, cereal, candy, soda,  nutrition bars, granola, chips, and sugar sweetened beverages.      -eat 5- 7 servings daily of veggies,  healthy protein such as chicken, fish,  beans, and eggs, and include healthy fats in your diet such as seeds, nuts, olive oil, avocados, and salmon.   -exercise 4 - 6 days per week as you are able, 150 minutes total weekly divided up is recommended.  -consider taking the fiber product psyllium capsules or powder 2 times daily (generic brand is fine) , or a brand name psyllium such as [facility] or Metamucil.  -consider also adding plant stanols and sterols such as Nature Made CholestOff, available over the counter.     Hyperlipidemia  See mild CAD a/p section    Ulcerative colitis  Followed by GI, Dr. Leon, last cscope in 1/2023  Prescribed Mesalamine 800 mg up to 2 tablets 3 times daily.  GI previously recommended immunosuppressive but patient deferred as she is not having active flares which is reasonable.  Return to GI for surveillance recommendations.    Restless legs syndrome  Does find some benefit from the Sinemet, she will continue as prescribed.  Refill provided today.    GERD (gastroesophageal reflux disease)  Known history of hiatal hernia and UC  Follows with GI - Dr. Leon  Last EGD 2022    Pantoprazole refilled today  Continue to  follow-up with GI as they have recommended.    Protracted URI  Previously treated with abx (doxy) early Dec 2024 via UC  Appears to be slowly recovering, no colored secretions, no fever  No antibiotic indicated at this time  Declines refill of albuterol, states made her feel dizzy      Other non-prescriptions measures as discussed below.     To minimize these symptoms, we look to treat the underlying cause of poor ventilation. Swelling related to allergies or inflammation can sometimes be helped by nasal steroid sprays such as Flonase or Nasacort over time (7 - 10 days)      If secretions are THICK:  Drink at least 6-8 glasses of water daily to thin secretions.  Mucinex can be used if secretions remain thick.     If secretions are THIN, watery, and abundant:  Antihistamine such as loratadine (claritin) can help dry up a drippy nose.       A teaspoon of honey every 4 hours as needed for cough has been shown to reduce cough as well or better than over-the-counter cough suppressants. Cough drops can also be helpful.      Gargling with warm salt water can help clear post nasal drip and soothe a sore throat.  Throat lozenges can also be helpful.       Fever or aches can be helped by taking acetaminophen (Tylenol) every four hours as needed, or ibuprofen (Motrin, Advil) or naproxen (Aleve) as directed if you are able.      Other options to help open up nasal passages and Eustachian tubes can include non-medicine intervention such as steam, essential oils such as Eucalyptus, peppermint, rosemary added to a diffuser or humidifier.     Mobilizing fluid and helping with congestion through repetitive exercise such as rebounding on a mini- trampoline, jumping jacks, or running may all help.      URIs usually improves within 2 weeks, but at times the cough will persist for three  or four weeks beyond that. If you are experiencing any shortness of breath, developing wheezing, or getting worse rather than better after the above  measures, and call the office for further evaluation.

## 2025-01-13 NOTE — LETTER
January 13, 2025     Patient: Milena Vidal   YOB: 1960   Date of Visit: 1/13/2025       To Whom It May Concern:     Milena Vidal  was seen in my office today . She may return to work on 1/15/25.    If you have any questions or concerns, please don't hesitate to call.         Sincerely,        Sandy Turner, APRN-CNP    CC: No Recipients

## 2025-01-13 NOTE — ASSESSMENT & PLAN NOTE
Scales reviewed and discussed, these indicate clinical remission.  Stable on Celexa 20 mg daily, continue as prescribed.  Diet and exercise recommendations revisited.   Consider adding on magnesium supplement - see wellness a/p section.

## 2025-01-13 NOTE — ASSESSMENT & PLAN NOTE
BP is 106/65 in office today. Goal BP is 130/80 or less, ideally 120/80 or less.   Reports not taking Metoprolol which was previously started by cardio as was unable to get refills from pharmacy.  BP is well-controlled in office today, she will continue off the BB at this time.    Plan:  -Continue Verapamil 120 mg daily.   -Routine labs ordered, these are to be done prior to 6 month evaluation.  -Patient to monitor BP outside the office and if finds consistently above 130/80 would resume the Metoprolol 25 mg daily. Patient to call for prescription if needed.      Patient to check BP regularly at home and keep a diary - DO THIS 1 HOUR AFTER TAKING MEDS.   This should be taken after sitting with feet flat on floor and resting for 5 minutes.   Arm should be level with your heart.   New guidelines recommend goal for blood pressure less than 130/80.   Ideally for stroke and heart attack risk reduction the systolic, or top, blood pressure number should be in the 110's or 120's.    PLEASE BRING BP CUFF IN TO NEXT VISIT FOR VALIDATION - TAKE YOUR BP @ HOME BEFORE YOU COME!

## 2025-01-13 NOTE — PROGRESS NOTES
Subjective    Patient ID: Coty Vidal is a 64 y.o. female.    Chief Complaint: Follow-up of the Left Ankle    HPI  This is a pleasant 64-year-old female presenting to the office for 5-week follow-up status post severe pain secondary to left Achilles tendinitis.  I did place patient in a short walking boot at the last visit to help limit mobility and help decrease pain inflammation.  Patient states that boot was of significant benefit, and she discontinued on January 8.  Patient was trying to get a hold of our DME personnel in regards to a lace up ankle brace, but due to an illness, she was unable to obtain brace.  Presents to the office today with continued complaints of Achilles tendon pain, but it has improved.  She has been performing formal physical therapy with improvement of strength and range of motion, but still has a minimal amount of pain.  Pain continues to be worse with any prolonged standing or walking as well as going up and down stairs.  She prefers not to take any over-the-counter medications but will take ibuprofen and Tylenol as needed.  She continues to apply ice and elevate.  She is a  for [employer] and has been out of work.    The patient's past medical, surgical, family, and social history as well as allergies and medications were reviewed and updated in the chart.    Objective   Ortho Exam  Pleasant in no acute distress.  Walks in the office today with a normal gait.  Left ankle normal appearance without soft tissue swelling erythema or ecchymosis.  There is no warmth upon touch.  She continues to be minimally tender upon palpation of Achilles tendon.  She has full range of motion of left foot and ankle.  She has adequate strength with resisted dorsiflexion and plantarflexion.  The ankle joint is stable.  Sensation is intact to light touch.    Image Results:  XR ankle left 3+ views  Narrative: Interpreted By:  Luis Linder,   STUDY:  XR ANKLE LEFT 3+ VIEWS; ;   12/12/2024 2:18 pm      INDICATION:  Signs/Symptoms:left ankle pain.      ,M25.572 Pain in left ankle and joints of left foot,G89.29 Other  chronic pain      COMPARISON:  08/18/2015      ACCESSION NUMBER(S):  [id number]      ORDERING CLINICIAN:  CAN MARISCAL      FINDINGS:  Left ankle, three views      There is no fracture. There is no dislocation. There are no  degenerative changes. There is no lytic or sclerotic lesion. There is  no soft tissue abnormality seen.      Impression: Normal radiographs of the left ankle          MACRO:  None      Signed by: Luis Linder 12/13/2024 8:09 PM  Dictation workstation:   [id number]      Assessment/Plan   Encounter Diagnoses:  Left Achilles tendinitis    Chronic pain of left ankle  Plan: Discussion with patient in regards to left Achilles tendinitis with improvement of symptoms following use of walking boot for a few weeks as well as formal physical therapy.  Explained to patient at this time that she can transition into a lace up ankle brace, as she continues to feel some sort of instability of the left ankle.  She should wear lace up ankle brace with a well supported tennis shoe.  She should continue with physical therapy and then a home exercise program after completion of physical therapy.  She can continue to take ibuprofen and Tylenol as needed.  She can apply ice and should avoid aggravating activities.  Patient can return to work on January 15, 2025, no restrictions.  She can follow-up as needed.

## 2025-01-13 NOTE — PROGRESS NOTES
Subjective   Patient ID: Coty Vidal is a 64 y.o. female who presents for Annual Exam (Pt presents for annual physical exam- ABN was given to pt and signed, pt verbalized understanding. Pt states that she would also like to discuss her sinus issues that have been going on for weeks now- she did send a my chart message in regards to this. Pt states that she has not been able to take the metoprolol or the atorvastatin.) and Flu Vaccine (Pt declines flu vaccine at this time. /).    HPI     Patient presents today for annual physical.  Patient concerns:    # Sinus issues  Ongoing for weeks  Around grand kids often  Treated with abx, isaac perles, inhaler by  in early December 2024  Continues with sinus congestion  Last fever about 2 weeks ago    WELLNESS VISIT   TDAP: 10/2020  SHINGRIX: completed  PNEUMOVAX: n/a  PAP: 10/2021 (ASCUS negative, HPV negative) (due 10/2026)   MAMMO: awaiting dx mammo and US 1/2025 - already has order.  EGD: 5/2022 (4 cm hiatal hernia, chronic inactive gastritis, fundic gland polyps, irregular GE junction but negative Polanco's, No. H. Pylori, No celiac sprue, no metaplasia, no dysplasia)   CSCOPE: 1/2023 (no polyps, diverticulosis, bx c/w Ulcerative Proctitis but no dysplasia)  DEXA: none found (hx of IBS)  HEP C SCREEN: 1/2023 negative  CACS: 0 (low) in 7/2020 (5 mm lung nodule, repeat CT 7/2021 with NO lung nodule)    Patient declines influenza vaccine.    Alcohol use: socially, 1 glass of wine per week  Smoking: never smoker    Dental: utd - scheduled Feb 2025  Vision: utd    Cervical cancer screening: utd  Denies family history in first degree relative.  Denies pelvic pain, vaginal discharge, or vaginal bleeding.    Breast cancer screening: utd  Denies family history in first degree relative.  Denies lumps/bumps, skin changes, nipple retraction, or nipple drainage.    Colon cancer screening: utd  Denies family history in first degree relative.  Denies melena, hematochezia,  constipation, diarrhea, bloating, change in bowel habits.    Thyroid disorder screenin/2023 TSH normal  Denies family history in first degree relative.  Denies heat or cold intolerance, diarrhea or constipation, coarsening of hair, and palpitations.       ROUTINE VISIT  CHRONIC CONDITIONS:   Mood  Hx of anxiety disorder  Strong FMHx in sister and brother    2023 TSH normal    Current regimen:  Celexa 20 mg daily.    PHQ-9: 6  AMPARO-7: 5    States mood stable overall  Feels anxiety mostly work triggered at this time  Does not have any breathing techniques or meditation that she does    Patient is taking and tolerating the medications as prescribed.   Patient denies suicidal ideation or homicidal ideation.   Patient denies any symptoms of cyn such as pressured speech, impulsive purchases.  Patient is satisfied with their current regimen and wishes to continue.     HTN  Current regimen:  Verapamil 120 mg daily.   Metoprolol 25 mg daily, started 2023 per cardio (hx of CAD)    Reports not currently taking Metoprolol, was unable to get refilled.  Patient is taking blood pressure outside of office and reports good control.  Patient denies chest pain, shortness of breath with exertion, palpitations, lower extremity edema, headache, or dizziness.     HLD/Mild CAD  Taking Atorvastatin 40 mg daily, started 2023 per cardio @ [facility]  Taking metoprolol 25 mg daily, started 2023 per cardio @ [facility]    2023 Stress test: stress echocardiography positive for ischemia in the LAD territory.    2023 heart cath with no intervention needed. LAD showed mild plaque without significant stenosis.    2023 TC/HDL ratio 2.8, HDL 52, LDL 52, TRIG 200     Reports stopped the BB and statin as was unable to get this refilled.  Patient denies any facial droop, sudden vision loss, weakness or numbness on one side of body.   Patient denies chest pain, shortness of breath with exertion, palpitations, or  symptoms of claudication.     UC  Followed by GI, Dr. Leon  Taking Mesalamine 800 mg up to 2 tablets 3 times daily.  1/2023 cscope    GI previously recommended immunosuppressive but patient deferred as she is not having active flares.      RLS  Prescribed Sinemet, states somewhat helpful  No side effects reported    Review of Systems   All other systems reviewed and are negative.      Objective   /65 (BP Location: Right arm, Patient Position: Sitting, BP Cuff Size: Adult)   Pulse 88   Temp 36.5 °C (97.7 °F) (Temporal)   Wt 83.3 kg (183 lb 9.6 oz)   LMP  (LMP Unknown)   SpO2 94%   BMI 33.58 kg/m²     Physical Exam  Constitutional:       General: She is not in acute distress.     Appearance: Normal appearance.   HENT:      Ears:      Comments: Slightly turbid fluid behind bilateral TMs     Nose:      Comments: Dry nasal turbinates     Mouth/Throat:      Lips: Pink.      Mouth: Mucous membranes are moist.   Cardiovascular:      Rate and Rhythm: Normal rate and regular rhythm.      Heart sounds: Normal heart sounds. No murmur heard.     No friction rub. No gallop.   Pulmonary:      Effort: Pulmonary effort is normal.      Breath sounds: Wheezing (slight with forced expiratorion in laryngeal region, lungs clear) present. No rhonchi or rales.   Abdominal:      Palpations: Abdomen is soft.      Tenderness: There is no abdominal tenderness. There is no guarding or rebound.   Musculoskeletal:      Right lower leg: No edema.      Left lower leg: No edema.   Skin:     General: Skin is warm and dry.   Neurological:      General: No focal deficit present.      Mental Status: She is alert and oriented to person, place, and time.   Psychiatric:         Mood and Affect: Mood normal.         Behavior: Behavior normal.         Assessment/Plan   Problem List Items Addressed This Visit             ICD-10-CM    Essential hypertension I10     BP is 106/65 in office today. Goal BP is 130/80 or less, ideally 120/80 or less.    Reports not taking Metoprolol which was previously started by cardio as was unable to get refills from pharmacy.  BP is well-controlled in office today, she will continue off the BB at this time.    Plan:  -Continue Verapamil 120 mg daily.   -Routine labs ordered, these are to be done prior to 6 month evaluation.  -Patient to monitor BP outside the office and if finds consistently above 130/80 would resume the Metoprolol 25 mg daily. Patient to call for prescription if needed.      Patient to check BP regularly at home and keep a diary - DO THIS 1 HOUR AFTER TAKING MEDS.   This should be taken after sitting with feet flat on floor and resting for 5 minutes.   Arm should be level with your heart.   New guidelines recommend goal for blood pressure less than 130/80.   Ideally for stroke and heart attack risk reduction the systolic, or top, blood pressure number should be in the 110's or 120's.    PLEASE BRING BP CUFF IN TO NEXT VISIT FOR VALIDATION - TAKE YOUR BP @ HOME BEFORE YOU COME!          Relevant Medications    verapamil SR (Calan-SR) 180 mg ER tablet    Anxiety disorder F41.9     Scales reviewed and discussed, these indicate clinical remission.  Stable on Celexa 20 mg daily, continue as prescribed.  Diet and exercise recommendations revisited.   Consider adding on magnesium supplement - see wellness a/p section.         Relevant Medications    citalopram (CeleXA) 20 mg tablet    GERD (gastroesophageal reflux disease) K21.9     Known history of hiatal hernia and UC  Follows with GI - Dr. Leon  Last EGD 2022    Pantoprazole refilled today  Continue to follow-up with GI as they have recommended.         Relevant Medications    pantoprazole (ProtoNix) 40 mg EC tablet    Hyperlipidemia E78.5     See mild CAD a/p section         Relevant Medications    atorvastatin (Lipitor) 40 mg tablet    Restless legs syndrome G25.81     Does find some benefit from the Sinemet, she will continue as prescribed.  Refill provided  today.         Ulcerative colitis K51.90     Followed by GI, Dr. Leon, last cscope in 1/2023  Prescribed Mesalamine 800 mg up to 2 tablets 3 times daily.  GI previously recommended immunosuppressive but patient deferred as she is not having active flares which is reasonable.  Return to GI for surveillance recommendations.         Mild CAD I25.10     12/2023 TC/HDL ratio 2.8, HDL 52, LDL 52, TRIG 200  Goal TC/HDL ratio 3.4 or less, LDL 99 or less,  or less, and TRIG 150 or less.     Not currently on statin as ran out of refills   Resume Atorvastatin 40 mg daily, previously started in 6/2023 via cardio as LDL >190  Diet and exercise recommendations revisited.   Repeat lipid panel ordered with routine labs.    To lower this with lifestyle measures:  -make sure you are avoiding refined carbs such as breads, pasta, cereal, candy, soda,  nutrition bars, granola, chips, and sugar sweetened beverages.      -eat 5- 7 servings daily of veggies,  healthy protein such as chicken, fish,  beans, and eggs, and include healthy fats in your diet such as seeds, nuts, olive oil, avocados, and salmon.   -exercise 4 - 6 days per week as you are able, 150 minutes total weekly divided up is recommended.  -consider taking the fiber product psyllium capsules or powder 2 times daily (generic brand is fine) , or a brand name psyllium such as [employer] or Metamucil.  -consider also adding plant stanols and sterols such as Nature Made CholestOff, available over the counter.          Relevant Medications    verapamil SR (Calan-SR) 180 mg ER tablet    Healthcare maintenance - Primary Z00.00     Vaccines and screenings reviewed.  Questionnaires completed.  Health and wellness topics reviewed.  Diet and exercise recommendations revisited.  Routine blood work ordered today.     VACCINES:  -Flu vaccine deferred today  -TDAP is good until 2020  -Shingrix previously completed, good for lifetime  -Pneumonia vaccine recommended at age  65    SCREENINGS:  -Screening pap last completed 2021 (neg/neg), repeat in 5 years. Due 2026.  -Screening mammogram due. She is to get dx mammo and US done at [place] 1/2025. Has orders already.  -Screening colonoscopy last completed in 1/2023, hx of UC, follow-up with GI for surveillance recommendations.  -Screening dexa recommened starting at age 65    LIFESTYLE MEASURES  -consider increasing protein intake provided no issues with kidneys to 1 gram per 1 pound of ideal body weight per not to exceed 150 gram per day. May have to supplement with a protein powder to achieve this goal.  -make sure you are avoiding refined carbs such as breads, pasta, cereal, candy, soda,  nutrition bars, granola, chips, and sugar sweetened beverages.      -eat 5- 7 servings daily of veggies,  healthy protein such as chicken, fish,  beans, and eggs, and include healthy fats in your diet such as seeds, nuts, olive oil, avocados, and salmon.   -exercise 4 - 6 days per week as you are able, 150 minutes total weekly divided up is recommended with 3-4 of those days including resistance/strength training.  -Vitamin D is recommended at 1000 - 5000 IU international units daily.   -Always wear sunscreen when you have sun exposure.  -64 oz of water is recommended daily.  -Dental visits recommended every 6 months.  -Eye exam recommended every 2 years, for those with vision problems every year.      I recommend considering starting a magesium supplement.  Different types of magnesium and their uses:    For bowels:  To help keep bowels moving (constipation prevention/treatment) magnesium oxide or magnesium citrate (very little magnesium absorbed)     For muscle cramping, sleep, and mood:   I recommend trying the inexpensive option first, this include magnesium chloride or magnesium glycinate 200-500 mg daily (if bowels become too loose back down dose)     If using for sleep, mood, focus and mag chloride or mag glycinate do not seem to be helping:    Can try more expensive ($$$) magnesium  with best CNS (brain) penetration, for sleep, focus, mood  - magnesium L threonate ,or threonine, or Magtein - 2000 mg, between 150 and 450 mg elemental magnesium in that 2000 mg depending on brand. This can be purchased online (amazon has one brand for as little as  $30/mo).         Relevant Orders    CBC    Comprehensive Metabolic Panel    Lipid Panel    Protracted URI J06.9     Previously treated with abx (doxy) early Dec 2024 via UC  Appears to be slowly recovering, no colored secretions, no fever  No antibiotic indicated at this time  Declines refill of albuterol, states made her feel dizzy      Other non-prescriptions measures as discussed below.     To minimize these symptoms, we look to treat the underlying cause of poor ventilation. Swelling related to allergies or inflammation can sometimes be helped by nasal steroid sprays such as Flonase or Nasacort over time (7 - 10 days)      If secretions are THICK:  Drink at least 6-8 glasses of water daily to thin secretions.  Mucinex can be used if secretions remain thick.     If secretions are THIN, watery, and abundant:  Antihistamine such as loratadine (claritin) can help dry up a drippy nose.       A teaspoon of honey every 4 hours as needed for cough has been shown to reduce cough as well or better than over-the-counter cough suppressants. Cough drops can also be helpful.      Gargling with warm salt water can help clear post nasal drip and soothe a sore throat.  Throat lozenges can also be helpful.       Fever or aches can be helped by taking acetaminophen (Tylenol) every four hours as needed, or ibuprofen (Motrin, Advil) or naproxen (Aleve) as directed if you are able.      Other options to help open up nasal passages and Eustachian tubes can include non-medicine intervention such as steam, essential oils such as Eucalyptus, peppermint, rosemary added to a diffuser or humidifier.     Mobilizing fluid and helping with  congestion through repetitive exercise such as rebounding on a mini- trampoline, jumping jacks, or running may all help.      URIs usually improves within 2 weeks, but at times the cough will persist for three  or four weeks beyond that. If you are experiencing any shortness of breath, developing wheezing, or getting worse rather than better after the above measures, and call the office for further evaluation.            Other Visit Diagnoses         Codes    Class 1 obesity with body mass index (BMI) of 33.0 to 33.9 in adult, unspecified obesity type, unspecified whether serious comorbidity present     E66.811, Z68.33            Follow-up in 6 months for recheck mood, HTN, HLD, RLS, UC.  Scales upon rooming.   Labs to be done prior.   Call for sooner follow-up if needed.       Scribe Attestation  By signing my name below, Rebeka KEVIN, Bibiana   attest that this documentation has been prepared under the direction and in the presence of Alejandra Sherwood DO.

## 2025-01-13 NOTE — ASSESSMENT & PLAN NOTE
Previously treated with abx (doxy) early Dec 2024 via   Appears to be slowly recovering, no colored secretions, no fever  No antibiotic indicated at this time  Declines refill of albuterol, states made her feel dizzy      Other non-prescriptions measures as discussed below.     To minimize these symptoms, we look to treat the underlying cause of poor ventilation. Swelling related to allergies or inflammation can sometimes be helped by nasal steroid sprays such as Flonase or Nasacort over time (7 - 10 days)      If secretions are THICK:  Drink at least 6-8 glasses of water daily to thin secretions.  Mucinex can be used if secretions remain thick.     If secretions are THIN, watery, and abundant:  Antihistamine such as loratadine (claritin) can help dry up a drippy nose.       A teaspoon of honey every 4 hours as needed for cough has been shown to reduce cough as well or better than over-the-counter cough suppressants. Cough drops can also be helpful.      Gargling with warm salt water can help clear post nasal drip and soothe a sore throat.  Throat lozenges can also be helpful.       Fever or aches can be helped by taking acetaminophen (Tylenol) every four hours as needed, or ibuprofen (Motrin, Advil) or naproxen (Aleve) as directed if you are able.      Other options to help open up nasal passages and Eustachian tubes can include non-medicine intervention such as steam, essential oils such as Eucalyptus, peppermint, rosemary added to a diffuser or humidifier.     Mobilizing fluid and helping with congestion through repetitive exercise such as rebounding on a mini- trampoline, jumping jacks, or running may all help.      URIs usually improves within 2 weeks, but at times the cough will persist for three  or four weeks beyond that. If you are experiencing any shortness of breath, developing wheezing, or getting worse rather than better after the above measures, and call the office for further evaluation.

## 2025-01-17 DIAGNOSIS — M76.62 LEFT ACHILLES TENDINITIS: ICD-10-CM

## 2025-01-20 ENCOUNTER — TREATMENT (OUTPATIENT)
Dept: PHYSICAL THERAPY | Facility: CLINIC | Age: 65
End: 2025-01-20
Payer: COMMERCIAL

## 2025-01-20 DIAGNOSIS — M79.672 LEFT FOOT PAIN: ICD-10-CM

## 2025-01-20 DIAGNOSIS — M76.62 LEFT ACHILLES TENDINITIS: ICD-10-CM

## 2025-01-20 DIAGNOSIS — M25.572 CHRONIC PAIN OF LEFT ANKLE: Primary | ICD-10-CM

## 2025-01-20 DIAGNOSIS — G89.29 CHRONIC PAIN OF LEFT ANKLE: Primary | ICD-10-CM

## 2025-01-20 PROCEDURE — 97110 THERAPEUTIC EXERCISES: CPT | Mod: GP

## 2025-01-20 PROCEDURE — 97140 MANUAL THERAPY 1/> REGIONS: CPT | Mod: GP

## 2025-01-20 NOTE — PROGRESS NOTES
"Physical Therapy    Physical Therapy Treatment    Patient Name: Milena Vidal \"[place]"  MRN: [id number]  Today's Date: 1/20/2025  Time Calculation  Start Time: 1047  Stop Time: 1132  Time Calculation (min): 45 min     PT Therapeutic Procedures Time Entry  Manual Therapy Time Entry: 15  Therapeutic Exercise Time Entry: 30        Insurance: [employer] [facility] [place], $20 COPAY  No authorization required by insurance after initial evaluation  Primary diagnosis: L ankle pain  Visit # 2      Assessment:  Patient returns for first follow up visit. Good set-up and technique upon review. Progressed with mobility and strengthening without adverse reaction. Addition of manual therapy to assist with management of pain and symptoms. Plan to assess between session response to taping at next visit. Patient aware to remove if adverse reaction arises. No change in pain at end of treatment.     Plan:  Continue per initial PT POC. Continue with manual therapy next visit and progress strengthening.    Current Problem  1. Chronic pain of left ankle  Follow Up In Physical Therapy      2. Left Achilles tendinitis  Follow Up In Physical Therapy      3. Left foot pain  Follow Up In Physical Therapy        Problem List Items Addressed This Visit             ICD-10-CM    Chronic pain of left ankle - Primary M25.572, G89.29    Left Achilles tendinitis M76.62     Other Visit Diagnoses         Codes    Left foot pain     M79.672            General   Evaluation 01/13/2025: referred for L Achilles tendonitis who presents with chronic, constant with tenderness to palpation L mid portion and insertion of Achilles, L lateral foot distal to malleoli, L plantar foot     Precautions:   STEADI Fall Risk Score (The score of 4 or more indicates an increased risk of falling): 2    Subjective    Back pain and calf pain after returning to work last week as , more pain with bending over  HEP daily    Pain  2/10 L Achilles, L anterior, lateral ankle "   4/10 plantar foot pain     Objective       Treatments:  Therapeutic Exercise:  Education on modification or discontinuation of any exercise if adverse reaction arises.  Scifit stepper x 5 mins ROM/warm-up/strength  Step stretch 2 x 10 reps ea  Standing incline board stretch 3 x 30 sec  Seated long duration submaximal loading isometric in heel raise position H30-45 3-5 reps  Supine heel slide to improve DF mobility x 20 reps  Seated plantar fascia stretch 3 x 30 sec  Seated hallux extension, lesser toe extension, toe splays x 15 reps ea    Manual Therapy: STM, MFR L gastroc soleus complex, plantar foot  Taping for PF L foot; patient aware to remove tape if adverse reaction arises     OP Education: verbal, demonstration, vesna mechanics with return to work duties     Access Code: [email or web address]  URL: [email or web address]  Date: 01/13/2025  Prepared by: Cheryl     Exercises  - Supine Calf Stretch with Strap (Mirrored)  - 1-2 x daily - 7 x weekly - 3 reps - 30 seconds hold  - Seated Heel Slide  - 1 x daily - 7 x weekly - 2 sets - 10 reps  - Seated Plantar Fascia Stretch (Mirrored)  - 1-2 x daily - 7 x weekly - 3 reps - 30 seconds hold  - Seated Plantar Fascia Mobilization with Small Ball  - 1 x daily - 20 reps  - Seated Heel Raise  - 1-2 x daily - 3-5 reps - 30-45 seconds hold       Goals:  Active       PT Problem       Patient will report minimal to no pain on average, pain no greater than 5/10 with home and work duties       Start:  01/13/25    Expected End:  04/13/25            Patient will improve L DF, INV ROM by 5 deg to increase ease ease with walking, stairs, work duties       Start:  01/13/25    Expected End:  04/13/25            Patient will increase strength by >1/3 MMT, heel raises 5-10 repetitions to assist with pain reduction in daily functional mobility       Start:  01/13/25    Expected End:  04/13/25            Patient will demonstrate independence and compliance with HEP and self  management       Start:  01/13/25    Expected End:  04/13/25            Patient will improve LEFS score by 9 points to meet minimal detectable change of improvement to demonstrate increased functional mobility       Start:  01/13/25    Expected End:  04/13/25

## 2025-01-24 ENCOUNTER — APPOINTMENT (OUTPATIENT)
Dept: PHYSICAL THERAPY | Facility: CLINIC | Age: 65
End: 2025-01-24
Payer: COMMERCIAL

## 2025-01-28 ENCOUNTER — TREATMENT (OUTPATIENT)
Dept: PHYSICAL THERAPY | Facility: CLINIC | Age: 65
End: 2025-01-28
Payer: COMMERCIAL

## 2025-01-28 DIAGNOSIS — M76.62 LEFT ACHILLES TENDINITIS: ICD-10-CM

## 2025-01-28 DIAGNOSIS — M79.672 LEFT FOOT PAIN: ICD-10-CM

## 2025-01-28 DIAGNOSIS — G89.29 CHRONIC PAIN OF LEFT ANKLE: Primary | ICD-10-CM

## 2025-01-28 DIAGNOSIS — M25.572 CHRONIC PAIN OF LEFT ANKLE: Primary | ICD-10-CM

## 2025-01-28 PROCEDURE — 97110 THERAPEUTIC EXERCISES: CPT | Mod: GP

## 2025-01-28 NOTE — PROGRESS NOTES
"Physical Therapy    Physical Therapy Treatment    Patient Name: Milena Vidal \"[place]"  MRN: [id number]  Today's Date: 1/28/2025  Time Calculation  Start Time: 1200  Stop Time: 1240  Time Calculation (min): 40 min     PT Therapeutic Procedures Time Entry  Therapeutic Exercise Time Entry: 40        Insurance: [employer] [place] [employer] [place], $20 COPAY  No authorization required by insurance after initial evaluation  Primary diagnosis: L ankle pain  Visit # 3      Assessment:  Progressed with therapeutic exercises in WB to patients tolerance without adverse reaction. Instructed in body mechanics with handout provided due to patient report of increased pain with work duties. Plan to ensure Good carry over at next visit.     Plan:  Continue per initial PT POC. Continue with manual therapy as indicated, progress strengthening to patients tolerance    Current Problem  1. Chronic pain of left ankle  Follow Up In Physical Therapy      2. Left Achilles tendinitis  Follow Up In Physical Therapy      3. Left foot pain  Follow Up In Physical Therapy          Problem List Items Addressed This Visit             ICD-10-CM    Chronic pain of left ankle - Primary M25.572, G89.29    Left Achilles tendinitis M76.62     Other Visit Diagnoses         Codes    Left foot pain     M79.672            General   Evaluation 01/13/2025: referred for L Achilles tendonitis who presents with chronic, constant with tenderness to palpation L mid portion and insertion of Achilles, L lateral foot distal to malleoli, L plantar foot     Precautions:   STEADI Fall Risk Score (The score of 4 or more indicates an increased risk of falling): 2    Subjective    Had pain on the outside of the foot at night, went to sleep and it went away.   Did not notice any pain in the Achilles up and down the stairs today.   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  [place] taping was helpful.    Pain  0/10 L Achilles, L anterior, " lateral ankle   0/10 plantar foot pain   4/10 low back pain    Objective       Treatments:  Therapeutic Exercise:  Education on modification or discontinuation of any exercise if adverse reaction arises.  Scifit stepper x 5 mins ROM/warm-up/strength  Step stretch 2 x 10 reps ea  Standing incline board stretch 3 x 30 sec  Standing resisted hip abduction, extension red TB 2 x 10 reps ea  Standing long duration submaximal loading isometric in heel raise position H30 x 3 reps  Resisted 4 way ankle with green TB x 15 reps ea  Instructed in bending/lifting/body mechanics with handout provided    Not today:  Supine heel slide to improve DF mobility x 20 reps  Seated plantar fascia stretch 3 x 30 sec  Seated hallux extension, lesser toe extension, toe splays x 15 reps ea    Not today:  Manual Therapy: STM, MFR L gastroc soleus complex, plantar foot  Taping for PF L foot; patient aware to remove tape if adverse reaction arises     OP Education: verbal, demonstration, body mechanics with return to work duties + green TB    Access Code: [email or web address]  URL: [email or web address]  Date: 01/28/2025  Prepared by: Cheryl    Exercises  - Isometric Ankle Inversion  - 1-2 x daily - 3-5 reps - 30-45 seconds hold  - Ankle Dorsiflexion with Resistance  - 1 x daily - 1-2 sets - 15 reps  - Ankle and Toe Plantarflexion with Resistance  - 1 x daily - 1-2 sets - 15 reps  - Ankle Eversion with Resistance  - 1 x daily - 1-2 sets - 15 reps  - Ankle Inversion with Resistance  - 1 x daily - 1-2 sets - 15 reps     Access Code: [email or web address]  URL: [email or web address]  Date: 01/13/2025  Prepared by: Cheryl     Exercises  - Supine Calf Stretch with Strap (Mirrored)  - 1-2 x daily - 7 x weekly - 3 reps - 30 seconds hold  - Seated Heel Slide  - 1 x daily - 7 x weekly - 2 sets - 10 reps  - Seated Plantar Fascia Stretch (Mirrored)  - 1-2 x daily - 7 x weekly - 3 reps - 30 seconds hold  - Seated Plantar Fascia  Mobilization with Small Ball  - 1 x daily - 20 reps  - Seated Heel Raise  - 1-2 x daily - 3-5 reps - 30-45 seconds hold       Goals:  Active       PT Problem       Patient will report minimal to no pain on average, pain no greater than 5/10 with home and work duties       Start:  01/13/25    Expected End:  04/13/25            Patient will improve L DF, INV ROM by 5 deg to increase ease ease with walking, stairs, work duties       Start:  01/13/25    Expected End:  04/13/25            Patient will increase strength by >1/3 MMT, heel raises 5-10 repetitions to assist with pain reduction in daily functional mobility       Start:  01/13/25    Expected End:  04/13/25            Patient will demonstrate independence and compliance with HEP and self management       Start:  01/13/25    Expected End:  04/13/25            Patient will improve LEFS score by 9 points to meet minimal detectable change of improvement to demonstrate increased functional mobility       Start:  01/13/25    Expected End:  04/13/25

## 2025-01-31 ENCOUNTER — APPOINTMENT (OUTPATIENT)
Dept: PHYSICAL THERAPY | Facility: CLINIC | Age: 65
End: 2025-01-31
Payer: COMMERCIAL

## 2025-02-03 ENCOUNTER — APPOINTMENT (OUTPATIENT)
Dept: PHYSICAL THERAPY | Facility: CLINIC | Age: 65
End: 2025-02-03
Payer: COMMERCIAL

## 2025-02-04 ENCOUNTER — TREATMENT (OUTPATIENT)
Dept: PHYSICAL THERAPY | Facility: CLINIC | Age: 65
End: 2025-02-04
Payer: COMMERCIAL

## 2025-02-04 DIAGNOSIS — M79.672 LEFT FOOT PAIN: ICD-10-CM

## 2025-02-04 DIAGNOSIS — M76.62 LEFT ACHILLES TENDINITIS: ICD-10-CM

## 2025-02-04 DIAGNOSIS — M25.572 CHRONIC PAIN OF LEFT ANKLE: Primary | ICD-10-CM

## 2025-02-04 DIAGNOSIS — G89.29 CHRONIC PAIN OF LEFT ANKLE: Primary | ICD-10-CM

## 2025-02-04 PROCEDURE — 97110 THERAPEUTIC EXERCISES: CPT | Mod: GP

## 2025-02-04 NOTE — PROGRESS NOTES
"Physical Therapy    Physical Therapy Treatment    Patient Name: Milena Vidal \"[place]"  MRN: [id number]  Today's Date: 2/4/2025  Time Calculation  Start Time: 1030  Stop Time: 1112  Time Calculation (min): 42 min     PT Therapeutic Procedures Time Entry  Therapeutic Exercise Time Entry: 42        Insurance: [employer] [place], $20 COPAY  No authorization required by insurance after initial evaluation  Primary diagnosis: L ankle pain  Visit # 4      Assessment:  Added lumbar extension which abolished L sided radiating symptoms and added to HEP. Education on posture, positioning with trial of lumbar roll between sessions, modification or discontinuation of any exercise that increases symptoms (i.e. peripheralization of symptoms). Reviewed body mechanics. No pain at end of treatment.    Plan:  Continue per initial PT POC. Assess response to lumbar extension between sessions. Patient is aware to discontinue if adverse reaction arises.    Current Problem  1. Chronic pain of left ankle  Follow Up In Physical Therapy      2. Left Achilles tendinitis  Follow Up In Physical Therapy      3. Left foot pain  Follow Up In Physical Therapy            Problem List Items Addressed This Visit             ICD-10-CM    Chronic pain of left ankle - Primary M25.572, G89.29    Left Achilles tendinitis M76.62     Other Visit Diagnoses         Codes    Left foot pain     M79.672              General   Evaluation 01/13/2025: referred for L Achilles tendonitis who presents with chronic, constant with tenderness to palpation L mid portion and insertion of Achilles, L lateral foot distal to malleoli, L plantar foot     Precautions:   STEADI Fall Risk Score (The score of 4 or more indicates an increased risk of falling): 2    Subjective    L sciatic nerve pain today to the knee.   Felt stiff in the Achilles going down the stairs today.  Watching bending, lifting at work - remembering to add the core.    Pain  5/10 L posterior lower " extremity, hamstring region, inner thigh to knee  No pain distal to knee today      Objective       Treatments:  Therapeutic Exercise:  Scifit stepper x 6 mins ROM/warm-up/strength  Step stretch 2 x 10 reps ea  Standing incline board stretch 3 x 30 sec  Standing heel raises 3 x 10 reps  Prone lying x 2 mins  Prone press ups on forearms 2 x 10 reps  Resisted ankle DF/INV/EVR with TB x 20 reps ea  Standing long duration submaximal loading isometric in heel raise position H30 x 3 reps  Standing gastroc stretch L 3 x 30 sec  Standing lumbar extension x 10 reps    Not today:  Standing resisted hip abduction, extension red TB 2 x 10 reps ea  Supine heel slide to improve DF mobility x 20 reps  Seated plantar fascia stretch 3 x 30 sec  Seated hallux extension, lesser toe extension, toe splays x 15 reps ea     OP Education: Re-encouraged HEP compliance; HEP handout    Access Code: [email or web address]  URL: [email or web address]  Date: 02/04/2025  Prepared by: Cheryl    Exercises  - Lying Prone  - 1-2 x daily - 7 x weekly - 5 mins hold  - Prone Press Up on Elbows  - 1-2 x daily - 7 x weekly - 2 sets - 10 reps  - Standing Lumbar Extension with Counter  - 1 x daily - 7 x weekly - 2 sets - 10 reps  - Seated Correct Posture     Access Code: [email or web address]  URL: [email or web address]  Date: 01/28/2025  Prepared by: Cheryl    Exercises  - Isometric Ankle Inversion  - 1-2 x daily - 3-5 reps - 30-45 seconds hold  - Ankle Dorsiflexion with Resistance  - 1 x daily - 1-2 sets - 15 reps  - Ankle and Toe Plantarflexion with Resistance  - 1 x daily - 1-2 sets - 15 reps  - Ankle Eversion with Resistance  - 1 x daily - 1-2 sets - 15 reps  - Ankle Inversion with Resistance  - 1 x daily - 1-2 sets - 15 reps     Access Code: [email or web address]  URL: [email or web address]  Date: 01/13/2025  Prepared by: Cheryl     Exercises  - Supine Calf Stretch with Strap (Mirrored)  - 1-2 x daily - 7 x weekly -  3 reps - 30 seconds hold  - Seated Heel Slide  - 1 x daily - 7 x weekly - 2 sets - 10 reps  - Seated Plantar Fascia Stretch (Mirrored)  - 1-2 x daily - 7 x weekly - 3 reps - 30 seconds hold  - Seated Plantar Fascia Mobilization with Small Ball  - 1 x daily - 20 reps  - Seated Heel Raise  - 1-2 x daily - 3-5 reps - 30-45 seconds hold       Goals:  Active       PT Problem       Patient will report minimal to no pain on average, pain no greater than 5/10 with home and work duties       Start:  01/13/25    Expected End:  04/13/25            Patient will improve L DF, INV ROM by 5 deg to increase ease ease with walking, stairs, work duties       Start:  01/13/25    Expected End:  04/13/25            Patient will increase strength by >1/3 MMT, heel raises 5-10 repetitions to assist with pain reduction in daily functional mobility       Start:  01/13/25    Expected End:  04/13/25            Patient will demonstrate independence and compliance with HEP and self management       Start:  01/13/25    Expected End:  04/13/25            Patient will improve LEFS score by 9 points to meet minimal detectable change of improvement to demonstrate increased functional mobility       Start:  01/13/25    Expected End:  04/13/25

## 2025-02-10 ENCOUNTER — APPOINTMENT (OUTPATIENT)
Dept: PHYSICAL THERAPY | Facility: CLINIC | Age: 65
End: 2025-02-10
Payer: COMMERCIAL

## 2025-02-11 ENCOUNTER — TREATMENT (OUTPATIENT)
Dept: PHYSICAL THERAPY | Facility: CLINIC | Age: 65
End: 2025-02-11
Payer: COMMERCIAL

## 2025-02-11 DIAGNOSIS — M76.62 LEFT ACHILLES TENDINITIS: ICD-10-CM

## 2025-02-11 DIAGNOSIS — M25.572 CHRONIC PAIN OF LEFT ANKLE: Primary | ICD-10-CM

## 2025-02-11 DIAGNOSIS — M79.672 LEFT FOOT PAIN: ICD-10-CM

## 2025-02-11 DIAGNOSIS — G89.29 CHRONIC PAIN OF LEFT ANKLE: Primary | ICD-10-CM

## 2025-02-11 PROCEDURE — 97110 THERAPEUTIC EXERCISES: CPT | Mod: GP

## 2025-02-11 NOTE — PROGRESS NOTES
"Physical Therapy    Physical Therapy Treatment    Patient Name: Milena Vidal \"[place]"  MRN: [id number]  Today's Date: 2/11/2025  Time Calculation  Start Time: 1027  Stop Time: 1108  Time Calculation (min): 41 min     PT Therapeutic Procedures Time Entry  Therapeutic Exercise Time Entry: 41        Insurance: [employer] [place], $20 COPAY  No authorization required by insurance after initial evaluation  Primary diagnosis: L ankle pain  Visit # 5/8      Assessment:  Continued favorable response to lumbar extension with abolished L sided radiating symptoms. Re-instructed in HEP compliance for strengthening, management of symptoms, trial of lumbar roll between sessions, as well as modification or discontinuation of any exercise that increases symptoms (i.e. peripheralization of symptoms). No pain at end of treatment.    Plan:  Continue per initial PT POC towards all PT goals to improve mobility, strength, and reduce symptoms.    Current Problem  1. Chronic pain of left ankle  Follow Up In Physical Therapy      2. Left Achilles tendinitis  Follow Up In Physical Therapy      3. Left foot pain  Follow Up In Physical Therapy            Problem List Items Addressed This Visit             ICD-10-CM    Chronic pain of left ankle - Primary M25.572, G89.29    Left Achilles tendinitis M76.62     Other Visit Diagnoses         Codes    Left foot pain     M79.672            General   Evaluation 01/13/2025: referred for L Achilles tendonitis who presents with chronic, constant with tenderness to palpation L mid portion and insertion of Achilles, L lateral foot distal to malleoli, L plantar foot     Precautions:   STEADI Fall Risk Score (The score of 4 or more indicates an increased risk of falling): 2    Subjective    Watching bending, lifting at work - using lumbar extensions at work  Some HEP throughout the day  No issues in the Achilles, foot this week. Stiffness in the Achilles is better going down the stairs  Didn't try " lumbar roll yet    Pain  1/10 L hamstring to knee    Objective       Treatments:  Therapeutic Exercise:  Scifit stepper x 6 mins ROM/warm-up/strength  Step stretch 2 x 10 reps ea  Standing heel raises 2 x 15 reps  Standing incline board stretch 3 x 30 sec  Standing lumbar extension x 10 reps  SLS H5 x 5 reps  Standing long duration submaximal loading isometric in heel raise position H30 x 3 reps on airex  Standing gastroc stretch L 3 x 30 sec  Resisted ankle DF/INV/EVR with Green TB x 20 reps ea  Prone lying, prone on elbows x 2 mins ea  Prone press ups on forearms 2 x 10 reps     OP Education: Re-encouraged HEP compliance; increase frequency of lumbar extension when onset of radiating pain arises    Access Code: [email or web address]  URL: [email or web address]  Date: 02/04/2025  Prepared by: Cheryl    Exercises  - Lying Prone  - 1-2 x daily - 7 x weekly - 5 mins hold  - Prone Press Up on Elbows  - 1-2 x daily - 7 x weekly - 2 sets - 10 reps  - Standing Lumbar Extension with Counter  - 1 x daily - 7 x weekly - 2 sets - 10 reps  - Seated Correct Posture     Access Code: [email or web address]  URL: [email or web address]  Date: 01/28/2025  Prepared by: Cheryl    Exercises  - Isometric Ankle Inversion  - 1-2 x daily - 3-5 reps - 30-45 seconds hold  - Ankle Dorsiflexion with Resistance  - 1 x daily - 1-2 sets - 15 reps  - Ankle and Toe Plantarflexion with Resistance  - 1 x daily - 1-2 sets - 15 reps  - Ankle Eversion with Resistance  - 1 x daily - 1-2 sets - 15 reps  - Ankle Inversion with Resistance  - 1 x daily - 1-2 sets - 15 reps     Access Code: [email or web address]  URL: [email or web address]  Date: 01/13/2025  Prepared by: Cheryl     Exercises  - Supine Calf Stretch with Strap (Mirrored)  - 1-2 x daily - 7 x weekly - 3 reps - 30 seconds hold  - Seated Heel Slide  - 1 x daily - 7 x weekly - 2 sets - 10 reps  - Seated Plantar Fascia Stretch (Mirrored)  - 1-2 x daily - 7 x weekly  - 3 reps - 30 seconds hold  - Seated Plantar Fascia Mobilization with Small Ball  - 1 x daily - 20 reps  - Seated Heel Raise  - 1-2 x daily - 3-5 reps - 30-45 seconds hold       Goals:  Active       PT Problem       Patient will report minimal to no pain on average, pain no greater than 5/10 with home and work duties       Start:  01/13/25    Expected End:  04/13/25            Patient will improve L DF, INV ROM by 5 deg to increase ease ease with walking, stairs, work duties       Start:  01/13/25    Expected End:  04/13/25            Patient will increase strength by >1/3 MMT, heel raises 5-10 repetitions to assist with pain reduction in daily functional mobility       Start:  01/13/25    Expected End:  04/13/25            Patient will demonstrate independence and compliance with HEP and self management       Start:  01/13/25    Expected End:  04/13/25            Patient will improve LEFS score by 9 points to meet minimal detectable change of improvement to demonstrate increased functional mobility       Start:  01/13/25    Expected End:  04/13/25

## 2025-02-17 ENCOUNTER — APPOINTMENT (OUTPATIENT)
Dept: PHYSICAL THERAPY | Facility: CLINIC | Age: 65
End: 2025-02-17
Payer: COMMERCIAL

## 2025-02-18 ENCOUNTER — TREATMENT (OUTPATIENT)
Dept: PHYSICAL THERAPY | Facility: CLINIC | Age: 65
End: 2025-02-18
Payer: COMMERCIAL

## 2025-02-18 DIAGNOSIS — M25.572 CHRONIC PAIN OF LEFT ANKLE: Primary | ICD-10-CM

## 2025-02-18 DIAGNOSIS — M79.672 LEFT FOOT PAIN: ICD-10-CM

## 2025-02-18 DIAGNOSIS — M76.62 LEFT ACHILLES TENDINITIS: ICD-10-CM

## 2025-02-18 DIAGNOSIS — G89.29 CHRONIC PAIN OF LEFT ANKLE: Primary | ICD-10-CM

## 2025-02-18 PROCEDURE — 97110 THERAPEUTIC EXERCISES: CPT | Mod: GP

## 2025-02-18 NOTE — PROGRESS NOTES
"Physical Therapy    Physical Therapy Treatment    Patient Name: Milena Vidal \"[place]"  MRN: [id number]  Today's Date: 2/18/2025  Time Calculation  Start Time: 1030  Stop Time: 1112  Time Calculation (min): 42 min     PT Therapeutic Procedures Time Entry  Therapeutic Exercise Time Entry: 42        Insurance: [employer] [place], $20 COPAY  No authorization required by insurance after initial evaluation  Primary diagnosis: L ankle pain  Visit # 6/8      Assessment:  Progressed with balance, strengthening without adverse reaction. She continues to progress towards all PT goals. No pain at end of treatment.    Plan:  Continue towards all PT goals to improve mobility, strength, and reduce symptoms.    Current Problem  1. Chronic pain of left ankle  Follow Up In Physical Therapy      2. Left Achilles tendinitis  Follow Up In Physical Therapy      3. Left foot pain  Follow Up In Physical Therapy          Problem List Items Addressed This Visit             ICD-10-CM    Chronic pain of left ankle - Primary M25.572, G89.29    Left Achilles tendinitis M76.62     Other Visit Diagnoses         Codes    Left foot pain     M79.672            General   Evaluation 01/13/2025: referred for L Achilles tendonitis who presents with chronic, constant with tenderness to palpation L mid portion and insertion of Achilles, L lateral foot distal to malleoli, L plantar foot     Precautions:   STEADI Fall Risk Score (The score of 4 or more indicates an increased risk of falling): 2    Subjective    No pain today. Noticed some pain yesterday under the arch, felt swollen, lasted half the day.  HEP daily. Didn't notice any issues with stairs today.  Continues to watch bending, lifting at work - using lumbar extensions at work.  Didn't try lumbar roll yet    Pain  0/10     Objective       Treatments:  Therapeutic Exercise:  Scifit stepper S15 x 6 mins ROM/warm-up/strength  Standing heel raises 2 x 15 reps  Step stretch 2 x 15 reps ea  Standing " incline board stretch 3 x 30 sec  Standing lumbar extension x 10 reps  SLS H5-10 x 5 reps  Standing long duration submaximal loading isometric in heel raise position H30 x 3 reps on airex  Standing gastroc stretch L 3 x 30 sec  Hip abduction, extension with red TB 2 x 10 reps  Prone lying, prone on elbows x 2 mins ea  Prone press ups on forearms 2 x 10 reps    Reviewed:  Resisted ankle DF/INV/EVR with Green TB x 20 reps ea    OP Education: continue lumbar extension when onset of radiating pain arises, lumbar roll     Access Code: [email or web address]  URL: [email or web address]  Date: 02/04/2025  Prepared by: Cheryl    Exercises  - Lying Prone  - 1-2 x daily - 7 x weekly - 5 mins hold  - Prone Press Up on Elbows  - 1-2 x daily - 7 x weekly - 2 sets - 10 reps  - Standing Lumbar Extension with Counter  - 1 x daily - 7 x weekly - 2 sets - 10 reps  - Seated Correct Posture     Access Code: [email or web address]  URL: [email or web address]  Date: 01/28/2025  Prepared by: Cheryl    Exercises  - Isometric Ankle Inversion  - 1-2 x daily - 3-5 reps - 30-45 seconds hold  - Ankle Dorsiflexion with Resistance  - 1 x daily - 1-2 sets - 15 reps  - Ankle and Toe Plantarflexion with Resistance  - 1 x daily - 1-2 sets - 15 reps  - Ankle Eversion with Resistance  - 1 x daily - 1-2 sets - 15 reps  - Ankle Inversion with Resistance  - 1 x daily - 1-2 sets - 15 reps     Access Code: [email or web address]  URL: [email or web address]  Date: 01/13/2025  Prepared by: Cheryl     Exercises  - Supine Calf Stretch with Strap (Mirrored)  - 1-2 x daily - 7 x weekly - 3 reps - 30 seconds hold  - Seated Heel Slide  - 1 x daily - 7 x weekly - 2 sets - 10 reps  - Seated Plantar Fascia Stretch (Mirrored)  - 1-2 x daily - 7 x weekly - 3 reps - 30 seconds hold  - Seated Plantar Fascia Mobilization with Small Ball  - 1 x daily - 20 reps  - Seated Heel Raise  - 1-2 x daily - 3-5 reps - 30-45 seconds hold        Goals:  Active       PT Problem       Patient will report minimal to no pain on average, pain no greater than 5/10 with home and work duties       Start:  01/13/25    Expected End:  04/13/25            Patient will improve L DF, INV ROM by 5 deg to increase ease ease with walking, stairs, work duties       Start:  01/13/25    Expected End:  04/13/25            Patient will increase strength by >1/3 MMT, heel raises 5-10 repetitions to assist with pain reduction in daily functional mobility       Start:  01/13/25    Expected End:  04/13/25            Patient will demonstrate independence and compliance with HEP and self management       Start:  01/13/25    Expected End:  04/13/25            Patient will improve LEFS score by 9 points to meet minimal detectable change of improvement to demonstrate increased functional mobility       Start:  01/13/25    Expected End:  04/13/25

## 2025-02-24 ENCOUNTER — APPOINTMENT (OUTPATIENT)
Dept: PHYSICAL THERAPY | Facility: CLINIC | Age: 65
End: 2025-02-24
Payer: COMMERCIAL

## 2025-02-25 ENCOUNTER — APPOINTMENT (OUTPATIENT)
Dept: PHYSICAL THERAPY | Facility: CLINIC | Age: 65
End: 2025-02-25
Payer: COMMERCIAL

## 2025-03-03 ENCOUNTER — APPOINTMENT (OUTPATIENT)
Dept: PHYSICAL THERAPY | Facility: CLINIC | Age: 65
End: 2025-03-03
Payer: COMMERCIAL

## 2025-03-04 ENCOUNTER — TREATMENT (OUTPATIENT)
Dept: PHYSICAL THERAPY | Facility: CLINIC | Age: 65
End: 2025-03-04
Payer: COMMERCIAL

## 2025-03-04 DIAGNOSIS — M79.672 LEFT FOOT PAIN: ICD-10-CM

## 2025-03-04 DIAGNOSIS — M25.572 CHRONIC PAIN OF LEFT ANKLE: Primary | ICD-10-CM

## 2025-03-04 DIAGNOSIS — M76.62 LEFT ACHILLES TENDINITIS: ICD-10-CM

## 2025-03-04 DIAGNOSIS — G89.29 CHRONIC PAIN OF LEFT ANKLE: Primary | ICD-10-CM

## 2025-03-04 PROCEDURE — 97110 THERAPEUTIC EXERCISES: CPT | Mod: GP

## 2025-03-04 NOTE — PROGRESS NOTES
"Physical Therapy    Physical Therapy Treatment    Patient Name: Mliena Vidal \"[place]"  MRN: [id number]  Today's Date: 3/4/2025  Time Calculation  Start Time: 1030  Stop Time: 1110  Time Calculation (min): 40 min     PT Therapeutic Procedures Time Entry  Therapeutic Exercise Time Entry: 40        Insurance: [facility], $20 COPAY  No authorization required by insurance after initial evaluation  Primary diagnosis: L ankle pain  Visit # 7/8      Assessment:  Patient continues to progress towards all PT goals. No adverse reaction with progression within session. No pain at end of treatment. Plan for goal review next.    Plan:  Goal review next.    Current Problem  1. Chronic pain of left ankle  Follow Up In Physical Therapy      2. Left Achilles tendinitis  Follow Up In Physical Therapy      3. Left foot pain  Follow Up In Physical Therapy          Problem List Items Addressed This Visit             ICD-10-CM    Chronic pain of left ankle - Primary M25.572, G89.29    Left Achilles tendinitis M76.62     Other Visit Diagnoses         Codes    Left foot pain     M79.672            General   Evaluation 01/13/2025: referred for L Achilles tendonitis who presents with chronic, constant with tenderness to palpation L mid portion and insertion of Achilles, L lateral foot distal to malleoli, L plantar foot     Precautions:   STEADI Fall Risk Score (The score of 4 or more indicates an increased risk of falling): 2    Subjective    L side of the back hurting this morning. Some pain in the top of the foot yesterday.  Using lumbar roll and continues to watch bending, lifting at work and using lumbar extensions at work.    Pain  0/10     Objective       Treatments:  Therapeutic Exercise:  Scifit stepper S15 x 6 mins ROM/warm-up/strength  SL heel raises 2 x 10 reps   Standing heel raises 2 x 15 reps  SLS H10-15 x 2 reps  Step stretch x 30 reps ea  Standing incline board stretch 3 x 30 sec  SL lateral step down x 10 reps " ea  Standing lumbar extension x 10 reps  Standing long duration submaximal loading isometric in heel raise position H30 x 3 reps on airex  Standing gastroc stretch L 3 x 30 sec  Hip abduction, extension with red TB 2 x 15 reps  Resisted ankle DF/INV/EVR with Blue TB x 20 reps ea  Prone lying, prone on elbows x 2 mins ea  Prone press ups on forearms 2 x 10 reps    OP Education: continue lumbar extension when onset of radiating pain arises  Blue TB    Access Code: [email or web address]  URL: [email or web address]  Date: 02/04/2025  Prepared by: Cheryl    Exercises  - Lying Prone  - 1-2 x daily - 7 x weekly - 5 mins hold  - Prone Press Up on Elbows  - 1-2 x daily - 7 x weekly - 2 sets - 10 reps  - Standing Lumbar Extension with Counter  - 1 x daily - 7 x weekly - 2 sets - 10 reps  - Seated Correct Posture     Access Code: [email or web address]  URL: [email or web address]  Date: 01/28/2025  Prepared by: Cheryl    Exercises  - Isometric Ankle Inversion  - 1-2 x daily - 3-5 reps - 30-45 seconds hold  - Ankle Dorsiflexion with Resistance  - 1 x daily - 1-2 sets - 15 reps  - Ankle and Toe Plantarflexion with Resistance  - 1 x daily - 1-2 sets - 15 reps  - Ankle Eversion with Resistance  - 1 x daily - 1-2 sets - 15 reps  - Ankle Inversion with Resistance  - 1 x daily - 1-2 sets - 15 reps     Access Code: [email or web address]  URL: [email or web address]  Date: 01/13/2025  Prepared by: Cheryl     Exercises  - Supine Calf Stretch with Strap (Mirrored)  - 1-2 x daily - 7 x weekly - 3 reps - 30 seconds hold  - Seated Heel Slide  - 1 x daily - 7 x weekly - 2 sets - 10 reps  - Seated Plantar Fascia Stretch (Mirrored)  - 1-2 x daily - 7 x weekly - 3 reps - 30 seconds hold  - Seated Plantar Fascia Mobilization with Small Ball  - 1 x daily - 20 reps  - Seated Heel Raise  - 1-2 x daily - 3-5 reps - 30-45 seconds hold       Goals:  Active       PT Problem       Patient will report minimal to no pain  on average, pain no greater than 5/10 with home and work duties       Start:  01/13/25    Expected End:  04/13/25            Patient will improve L DF, INV ROM by 5 deg to increase ease ease with walking, stairs, work duties       Start:  01/13/25    Expected End:  04/13/25            Patient will increase strength by >1/3 MMT, heel raises 5-10 repetitions to assist with pain reduction in daily functional mobility       Start:  01/13/25    Expected End:  04/13/25            Patient will demonstrate independence and compliance with HEP and self management       Start:  01/13/25    Expected End:  04/13/25            Patient will improve LEFS score by 9 points to meet minimal detectable change of improvement to demonstrate increased functional mobility       Start:  01/13/25    Expected End:  04/13/25

## 2025-03-10 ENCOUNTER — APPOINTMENT (OUTPATIENT)
Dept: PHYSICAL THERAPY | Facility: CLINIC | Age: 65
End: 2025-03-10
Payer: COMMERCIAL

## 2025-03-11 ENCOUNTER — TREATMENT (OUTPATIENT)
Dept: PHYSICAL THERAPY | Facility: CLINIC | Age: 65
End: 2025-03-11
Payer: COMMERCIAL

## 2025-03-11 DIAGNOSIS — M79.672 LEFT FOOT PAIN: ICD-10-CM

## 2025-03-11 DIAGNOSIS — M76.62 LEFT ACHILLES TENDINITIS: ICD-10-CM

## 2025-03-11 DIAGNOSIS — G89.29 CHRONIC PAIN OF LEFT ANKLE: Primary | ICD-10-CM

## 2025-03-11 DIAGNOSIS — M25.572 CHRONIC PAIN OF LEFT ANKLE: Primary | ICD-10-CM

## 2025-03-11 PROCEDURE — 97110 THERAPEUTIC EXERCISES: CPT | Mod: GP

## 2025-03-11 NOTE — PROGRESS NOTES
"Physical Therapy    Physical Therapy Treatment    Patient Name: Milena Vidal \"[place]"  MRN: [id number]  Today's Date: 3/11/2025  Time Calculation  Start Time: 1038  Stop Time: 1125  Time Calculation (min): 47 min     PT Therapeutic Procedures Time Entry  Therapeutic Exercise Time Entry: 47        Insurance: [facility], $20 COPAY  No authorization required by insurance after initial evaluation  Primary diagnosis: L ankle pain  Visit # 8/8      Assessment:  Patient demonstrates improvement in objective measurements, meeting set PT goals of increased ROM and strength, as well as reduced pain levels. She reports decreased HEP compliance this recently; therefore, encouraged HEP compliance and consistency due to favorable response when she adheres to her HEP. Recommend patient continue with HEP consistently, as well as to return for follow up with referring provider for further medical management to manage intermittent high levels of pain.    Plan:  Discharge from PT POC. Plateau of progress. Patient demonstrates improvement in objective measurements, meeting set PT goals of increased ROM and strength, as well as reduced pain levels. She reports decreased HEP compliance this recently; therefore, encouraged HEP compliance and consistency due to favorable response when she adheres to her HEP. Recommend patient continue with HEP consistently, as well as to return for follow up with referring provider for further medical management to manage intermittent high levels of pain.    Current Problem  1. Chronic pain of left ankle  Follow Up In Physical Therapy      2. Left foot pain  Follow Up In Physical Therapy      3. Left Achilles tendinitis  Follow Up In Physical Therapy          Problem List Items Addressed This Visit             ICD-10-CM    Chronic pain of left ankle - Primary M25.572, G89.29    Left Achilles tendinitis M76.62     Other Visit Diagnoses         Codes    Left foot pain     M79.672      "       General   Evaluation 01/13/2025: referred for L Achilles tendonitis who presents with chronic, constant with tenderness to palpation L mid portion and insertion of Achilles, L lateral foot distal to malleoli, L plantar foot     Precautions:   STEADI Fall Risk Score (The score of 4 or more indicates an increased risk of falling): 2    Subjective    0/10 pain. Still gets occasional pain on the stairs, not always. Not staying consistent this past week with exercises.  Using lumbar roll and is aware of body mechanics with bending, lifting at work. Continue with lumbar extensions at work as needed.    Pain  0/10     Objective   Gait: Indep ambulation, non-antalgic gait pattern  Posture: comparable in standing     Ankle & Foot AROM:  Dorsiflexion: R 15 deg, L 12 deg  Plantarflexion: R 48 deg, L 45 deg  Inversion: R 35 deg, L 40 deg  Eversion: R 6 deg, L 6 deg     Hip Strength:  Flexion: R 4+/5, L 4+/5     Knee Strength:  Flexion: 4+/5 B  Extension: 4+/5 B     L Ankle & Foot Strength:  Dorsiflexion: 4+/5  SL heel raises R 11 reps, L 14 reps  Inversion: 4+/5  Eversion: 5/5     Outcome Measures:  Lower Extremity Functional Scale (LEFS): 65      Treatments:  Therapeutic Exercise:  Reassess  Reviewed/Performed:  Scifit stepper S15 x 6 mins ROM/warm-up/strength  Standing heel raises 2 x 15 reps  SLS H10-15 x 5 reps  Step stretch x 30 reps ea  SL lateral step down 2 x 10 reps ea  Standing incline board stretch 3 x 30 sec  Hip abduction, extension with red TB 2 x 15 reps  Standing lumbar extension x 10 reps  Standing long duration submaximal loading isometric in heel raise position H30 x 3 reps on airex  Standing gastroc stretch L 3 x 30 sec  Resisted ankle DF/INV/EVR with Blue TB x 20 reps ea  Prone lying, prone on elbows x 2 mins ea  Prone press ups on forearms 2 x 10 reps    OP Education: Consistent HEP    Access Code: [email or web address]  URL: [email or web address]  Date: 03/11/2025  Prepared by:  Cheryl    Exercises  - Single Leg Heel Raise with Counter Support  - 1 x daily - 7 x weekly - 1-2 sets - 10 reps  - Standing Heel Raise with Support  - 1 x daily - 7 x weekly - 2 sets - 15 reps  - Gastroc Stretch on Wall  - 1 x daily - 7 x weekly - 3 reps - 30 seconds hold  Access Code: [email or web address]  - Hip Extension with Resistance Loop  - 1 x daily - 7 x weekly - 2 sets - 10 reps  - Standing Hip Abduction Kicks  - 1 x daily - 7 x weekly - 2 sets - 10 reps    Access Code: [email or web address]  URL: [email or web address]  Date: 02/04/2025  Prepared by: Cheryl    Exercises  - Lying Prone  - 1-2 x daily - 7 x weekly - 5 mins hold  - Prone Press Up on Elbows  - 1-2 x daily - 7 x weekly - 2 sets - 10 reps  - Standing Lumbar Extension with Counter  - 1 x daily - 7 x weekly - 2 sets - 10 reps  - Seated Correct Posture     Access Code: [email or web address]  URL: [email or web address]  Date: 01/28/2025  Prepared by: Cheryl    Exercises  - Isometric Ankle Inversion  - 1-2 x daily - 3-5 reps - 30-45 seconds hold  - Ankle Dorsiflexion with Resistance  - 1 x daily - 1-2 sets - 15 reps  - Ankle and Toe Plantarflexion with Resistance  - 1 x daily - 1-2 sets - 15 reps  - Ankle Eversion with Resistance  - 1 x daily - 1-2 sets - 15 reps  - Ankle Inversion with Resistance  - 1 x daily - 1-2 sets - 15 reps     Access Code: [email or web address]  URL: [email or web address]  Date: 01/13/2025  Prepared by: Cheryl     Exercises  - Supine Calf Stretch with Strap (Mirrored)  - 1-2 x daily - 7 x weekly - 3 reps - 30 seconds hold  - Seated Heel Slide  - 1 x daily - 7 x weekly - 2 sets - 10 reps  - Seated Plantar Fascia Stretch (Mirrored)  - 1-2 x daily - 7 x weekly - 3 reps - 30 seconds hold  - Seated Plantar Fascia Mobilization with Small Ball  - 1 x daily - 20 reps  - Seated Heel Raise  - 1-2 x daily - 3-5 reps - 30-45 seconds hold       Goals:  Resolved       PT Problem       Patient will report minimal  to no pain on average, pain no greater than 5/10 with home and work duties (Adequate for Discharge)       Start:  01/13/25    Expected End:  04/13/25         Goal Note       Goal partially met; 0/10 pain levels with occasional shooting pain to 10/10              Patient will improve L DF, INV ROM by 5 deg to increase ease ease with walking, stairs, work duties (Met)       Start:  01/13/25    Expected End:  04/13/25    Resolved:  03/11/25         Patient will increase strength by >1/3 MMT, heel raises 5-10 repetitions to assist with pain reduction in daily functional mobility (Met)       Start:  01/13/25    Expected End:  04/13/25    Resolved:  03/11/25         Patient will demonstrate independence and compliance with HEP and self management (Adequate for Discharge)       Start:  01/13/25    Expected End:  04/13/25         Goal Note       Goal partially met; encouraged HEP compliance and consistency due to favorable response to HEP adherence               Patient will improve LEFS score by 9 points to meet minimal detectable change of improvement to demonstrate increased functional mobility (Met)       Start:  01/13/25    Expected End:  04/13/25    Resolved:  03/11/25

## 2025-04-04 DIAGNOSIS — G25.81 RESTLESS LEG: ICD-10-CM

## 2025-04-05 RX ORDER — CARBIDOPA AND LEVODOPA 25; 100 MG/1; MG/1
1 TABLET ORAL NIGHTLY
Qty: 90 TABLET | Refills: 1 | Status: SHIPPED | OUTPATIENT
Start: 2025-04-05

## 2025-04-05 NOTE — TELEPHONE ENCOUNTER
BRIEF NOTE    Subjective/Objective:  Patient is new to me as PCP, as Dr. Sherwood left the practice in January.     Pharmacy refill request for Sinemet. She takes this for RLS, no Parkinsons based on chart review. Patient has appt in July with me.     Assessment/Plan:  1. Restless leg  - carbidopa-levodopa (Sinemet)  mg tablet; Take 1 tablet by mouth once daily at bedtime.  Dispense: 90 tablet; Refill: 1      No problem-specific Assessment & Plan notes found for this encounter.        Zenia Shah DO, MSEd  [facility]  Office: [phone]  4/5/2025 5:33 PM

## 2025-04-07 ENCOUNTER — HOSPITAL ENCOUNTER (OUTPATIENT)
Dept: RADIOLOGY | Facility: HOSPITAL | Age: 65
Discharge: HOME | End: 2025-04-07
Payer: COMMERCIAL

## 2025-04-07 ENCOUNTER — HOSPITAL ENCOUNTER (OUTPATIENT)
Dept: RADIOLOGY | Facility: CLINIC | Age: 65
Discharge: HOME | End: 2025-04-07
Payer: COMMERCIAL

## 2025-04-07 ENCOUNTER — OFFICE VISIT (OUTPATIENT)
Dept: PRIMARY CARE | Facility: CLINIC | Age: 65
End: 2025-04-07
Payer: COMMERCIAL

## 2025-04-07 VITALS
SYSTOLIC BLOOD PRESSURE: 138 MMHG | RESPIRATION RATE: 16 BRPM | WEIGHT: 186.2 LBS | DIASTOLIC BLOOD PRESSURE: 81 MMHG | BODY MASS INDEX: 34.06 KG/M2 | HEART RATE: 85 BPM | OXYGEN SATURATION: 96 % | TEMPERATURE: 98 F

## 2025-04-07 DIAGNOSIS — G25.81 RESTLESS LEG SYNDROME: ICD-10-CM

## 2025-04-07 DIAGNOSIS — M51.360 DEGENERATION OF INTERVERTEBRAL DISC OF LUMBAR REGION WITH DISCOGENIC BACK PAIN: ICD-10-CM

## 2025-04-07 DIAGNOSIS — M41.9 SCOLIOSIS OF THORACIC SPINE, UNSPECIFIED SCOLIOSIS TYPE: ICD-10-CM

## 2025-04-07 DIAGNOSIS — M51.34 DEGENERATIVE DISC DISEASE, THORACIC: ICD-10-CM

## 2025-04-07 DIAGNOSIS — M62.830 MUSCLE SPASM OF BACK: ICD-10-CM

## 2025-04-07 DIAGNOSIS — Z82.0 FAMILY HISTORY OF PARKINSON DISEASE: ICD-10-CM

## 2025-04-07 DIAGNOSIS — E78.5 HYPERLIPIDEMIA, UNSPECIFIED HYPERLIPIDEMIA TYPE: ICD-10-CM

## 2025-04-07 DIAGNOSIS — M51.34 DEGENERATIVE DISC DISEASE, THORACIC: Primary | ICD-10-CM

## 2025-04-07 PROCEDURE — 72050 X-RAY EXAM NECK SPINE 4/5VWS: CPT | Performed by: RADIOLOGY

## 2025-04-07 PROCEDURE — 99214 OFFICE O/P EST MOD 30 MIN: CPT | Performed by: FAMILY MEDICINE

## 2025-04-07 PROCEDURE — 72110 X-RAY EXAM L-2 SPINE 4/>VWS: CPT | Performed by: RADIOLOGY

## 2025-04-07 PROCEDURE — 1036F TOBACCO NON-USER: CPT | Performed by: FAMILY MEDICINE

## 2025-04-07 PROCEDURE — 72110 X-RAY EXAM L-2 SPINE 4/>VWS: CPT

## 2025-04-07 PROCEDURE — 72072 X-RAY EXAM THORAC SPINE 3VWS: CPT

## 2025-04-07 PROCEDURE — 3079F DIAST BP 80-89 MM HG: CPT | Performed by: FAMILY MEDICINE

## 2025-04-07 PROCEDURE — 72050 X-RAY EXAM NECK SPINE 4/5VWS: CPT

## 2025-04-07 PROCEDURE — 3075F SYST BP GE 130 - 139MM HG: CPT | Performed by: FAMILY MEDICINE

## 2025-04-07 PROCEDURE — 72072 X-RAY EXAM THORAC SPINE 3VWS: CPT | Performed by: RADIOLOGY

## 2025-04-07 ASSESSMENT — PATIENT HEALTH QUESTIONNAIRE - PHQ9
2. FEELING DOWN, DEPRESSED OR HOPELESS: SEVERAL DAYS
SUM OF ALL RESPONSES TO PHQ9 QUESTIONS 1 AND 2: 2
1. LITTLE INTEREST OR PLEASURE IN DOING THINGS: SEVERAL DAYS
10. IF YOU CHECKED OFF ANY PROBLEMS, HOW DIFFICULT HAVE THESE PROBLEMS MADE IT FOR YOU TO DO YOUR WORK, TAKE CARE OF THINGS AT HOME, OR GET ALONG WITH OTHER PEOPLE: SOMEWHAT DIFFICULT

## 2025-04-07 NOTE — PROGRESS NOTES
" Subjective   Patient ID: Milena Vidal \"[place]" is a 64 y.o. female who presents for Back Pain, Headache, and Right shoulder pain.  HPI  C/o headaches coming back .    - rxed mouthguard at night   - rxed cyclobenzaprine  - verapamil     C/o back pain  mid chest / thoracic  in the morning , when goes to wash her hands  sharp sudden    C/o back pain, lumbar  for a couple of months. No specific injuries worse with bending, twisting.  Running sweeper vacuum  . Bending to  items and scrub/ clean items below level of waist is painful /     C/o right shoulder pain . Developed onher way here .  No injury . But was using brush to clean snow off her car    Current tx :  none  .      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    Physical Therapy visits noted in chart - seeing her for her ankle. To strengthen the muscles .   Completed therapy , and doing home exercises       Review of Systems    Objective   /81 (BP Location: Left arm, Patient Position: Sitting, BP Cuff Size: Large adult)   Pulse 85   Temp 36.7 °C (98 °F) (Temporal)   Resp 16   Wt 84.5 kg (186 lb 3.2 oz)   LMP  (LMP Unknown)   SpO2 96%   BMI 34.06 kg/m²     Physical Exam    Alert.  In pain .  Intermittently rubbing right shoulder  and appears to be having spasms in neck .     Pertinent exam : appears in moderate pain  Palpable Spasm of bilateral trapezius  .  R> L   Limited rom of lateral rotation neck / right   Reflex ue symmetric   Strength 5/5     Shoulder  rom intact painful on the right .   No pain over clavicle  , mild pain AC joint and GH joint on the right.     No obv deformity / swelling/ redness/ discoloration .   Pulses intact .     Spine: nontender . Mild kyphosis   Parasp muscles -tender in mid thoracic area and lumbar area     SLR neg bilaterally . "     Assessment/Plan   Problem List Items Addressed This Visit          Medium    Hyperlipidemia    Relevant Orders    Lipid Panel     Other Visit Diagnoses       Degenerative disc disease, thoracic    -  Primary    Relevant Orders    Comprehensive Metabolic Panel    CBC and Auto Differential    Uric Acid    RAINER with Reflex to PUAJ    TSH with reflex to Free T4 if abnormal    Sedimentation Rate    Rheumatoid factor    Lipid Panel    Referral to Physical Therapy    Degeneration of intervertebral disc of lumbar region with discogenic back pain        Relevant Orders    Comprehensive Metabolic Panel    CBC and Auto Differential    Uric Acid    RAINER with Reflex to PUJA    TSH with reflex to Free T4 if abnormal    Sedimentation Rate    Rheumatoid factor    Lipid Panel    Referral to Physical Therapy    Scoliosis of thoracic spine, unspecified scoliosis type        Relevant Orders    Referral to Physical Therapy    Restless leg syndrome        Relevant Orders    Comprehensive Metabolic Panel    CBC and Auto Differential    Uric Acid    RAINER with Reflex to PUJA    TSH with reflex to Free T4 if abnormal    Sedimentation Rate    Rheumatoid factor    Lipid Panel    Referral to Neurology    Muscle spasm of back        Relevant Orders    Referral to Neurology    Family history of Parkinson disease        Relevant Orders    Referral to Neurology          Low back pain 2 months  . Suspect arthritis and degenerative changes  .   W associated spasm. Home tx not adequate for relief   - image, to confirm  dxis  - treatment discussed.  Pt wants to avoid more pills , but willing to start on something   -folllow up , virtual   - PT eval and tx for low back and thoracic sprain    Thoracic sprain/ spasm   - for a long time   -  xrays   - treatment for degen disc in lumbar should help thoracic spine as well     Rt Shoulder pain / sprain   - suspect arthritis and some referred pain from neck/ thoracic muscle strain   - heat / lidocaine patch /  oral tylenol.     Headache, chronic , non migraine   -has been intermittent these past few months as well .   - did not separately treat  - she has bite guard, as she has TMJ. Will continue to use this  -check labwork   - continue Verapamil for prevention  ; Magnesium for prevention ;    May be flared from neck spasms    - consider other preventive measures pending results of testing  and trial on daily acetaminophen   FAMHX of Parkinsons  - pt has been on a decade or so of Sinemet for tx of RLS .  Her spasms/ mskel c/o ; muscle fatiguing   and describes handwriting changing- I would like her evaluated for Parkinsons  . Referral to Neurology      FMLA headache, back   Jan 31, 2025  Previous FMLA for left foot  reviewed in MEDIA , was done by foot/ ankle specialist through  1/24/25   Reviewed paperwork that patient brought.   She provided me a fax number and name of persons to send the completed form to. There are forms she has to complete and sign, I returned those to her       Follow up  in a few weeks, virtual.     JON Faulkner MD

## 2025-04-07 NOTE — PATIENT INSTRUCTIONS
Xrays ordered  Labwork ordered  Medication recommended    -- Acetaminophen  650 mg   .  Take 1 pill twice a day   -- lidocaine  Patch .  Leave on for 10- 12 hrs  .   -- Physical  therapy       4.  FMLA  =  Intermittent Leave -  2  day a week , 6 months   1/31 - 7/31/2025     5. Followup   Thursday May 1,  1130

## 2025-04-08 LAB
ALBUMIN SERPL-MCNC: 4.8 G/DL (ref 3.6–5.1)
ALP SERPL-CCNC: 97 U/L (ref 37–153)
ALT SERPL-CCNC: 30 U/L (ref 6–29)
ANA SER QL IF: NEGATIVE
ANION GAP SERPL CALCULATED.4IONS-SCNC: 13 MMOL/L (CALC) (ref 7–17)
AST SERPL-CCNC: 22 U/L (ref 10–35)
BASOPHILS # BLD AUTO: 89 CELLS/UL (ref 0–200)
BASOPHILS NFR BLD AUTO: 1.5 %
BILIRUB SERPL-MCNC: 0.6 MG/DL (ref 0.2–1.2)
BUN SERPL-MCNC: 20 MG/DL (ref 7–25)
CALCIUM SERPL-MCNC: 9.6 MG/DL (ref 8.6–10.4)
CHLORIDE SERPL-SCNC: 104 MMOL/L (ref 98–110)
CHOLEST SERPL-MCNC: 182 MG/DL
CHOLEST/HDLC SERPL: 2.8 (CALC)
CO2 SERPL-SCNC: 25 MMOL/L (ref 20–32)
CREAT SERPL-MCNC: 0.67 MG/DL (ref 0.5–1.05)
EGFRCR SERPLBLD CKD-EPI 2021: 98 ML/MIN/1.73M2
EOSINOPHIL # BLD AUTO: 112 CELLS/UL (ref 15–500)
EOSINOPHIL NFR BLD AUTO: 1.9 %
ERYTHROCYTE [DISTWIDTH] IN BLOOD BY AUTOMATED COUNT: 12.9 % (ref 11–15)
ERYTHROCYTE [SEDIMENTATION RATE] IN BLOOD BY WESTERGREN METHOD: 31 MM/H
GLUCOSE SERPL-MCNC: 88 MG/DL (ref 65–99)
HCT VFR BLD AUTO: 44.4 % (ref 35–45)
HDLC SERPL-MCNC: 66 MG/DL
HGB BLD-MCNC: 14.5 G/DL (ref 11.7–15.5)
LDLC SERPL CALC-MCNC: 92 MG/DL (CALC)
LYMPHOCYTES # BLD AUTO: 1676 CELLS/UL (ref 850–3900)
LYMPHOCYTES NFR BLD AUTO: 28.4 %
MCH RBC QN AUTO: 29.7 PG (ref 27–33)
MCHC RBC AUTO-ENTMCNC: 32.7 G/DL (ref 32–36)
MCV RBC AUTO: 90.8 FL (ref 80–100)
MONOCYTES # BLD AUTO: 348 CELLS/UL (ref 200–950)
MONOCYTES NFR BLD AUTO: 5.9 %
NEUTROPHILS # BLD AUTO: 3676 CELLS/UL (ref 1500–7800)
NEUTROPHILS NFR BLD AUTO: 62.3 %
NONHDLC SERPL-MCNC: 116 MG/DL (CALC)
PLATELET # BLD AUTO: 287 THOUSAND/UL (ref 140–400)
PMV BLD REES-ECKER: 10 FL (ref 7.5–12.5)
POTASSIUM SERPL-SCNC: 4.2 MMOL/L (ref 3.5–5.3)
PROT SERPL-MCNC: 7.5 G/DL (ref 6.1–8.1)
RBC # BLD AUTO: 4.89 MILLION/UL (ref 3.8–5.1)
RHEUMATOID FACT SERPL-ACNC: <10 IU/ML
SODIUM SERPL-SCNC: 142 MMOL/L (ref 135–146)
TRIGL SERPL-MCNC: 139 MG/DL
TSH SERPL-ACNC: 1.22 MIU/L (ref 0.4–4.5)
URATE SERPL-MCNC: 4.7 MG/DL (ref 2.5–7)
WBC # BLD AUTO: 5.9 THOUSAND/UL (ref 3.8–10.8)

## 2025-04-15 ENCOUNTER — PATIENT MESSAGE (OUTPATIENT)
Dept: PRIMARY CARE | Facility: CLINIC | Age: 65
End: 2025-04-15
Payer: COMMERCIAL

## 2025-04-21 ENCOUNTER — APPOINTMENT (OUTPATIENT)
Dept: PHYSICAL THERAPY | Facility: CLINIC | Age: 65
End: 2025-04-21
Payer: COMMERCIAL

## 2025-04-23 ENCOUNTER — TELEPHONE (OUTPATIENT)
Dept: PRIMARY CARE | Facility: CLINIC | Age: 65
End: 2025-04-23

## 2025-04-23 ENCOUNTER — EVALUATION (OUTPATIENT)
Dept: PHYSICAL THERAPY | Facility: CLINIC | Age: 65
End: 2025-04-23
Payer: COMMERCIAL

## 2025-04-23 DIAGNOSIS — G89.29 CHRONIC MIDLINE THORACIC BACK PAIN: ICD-10-CM

## 2025-04-23 DIAGNOSIS — G89.29 CHRONIC BILATERAL LOW BACK PAIN WITH BILATERAL SCIATICA: Primary | ICD-10-CM

## 2025-04-23 DIAGNOSIS — M62.81 MUSCLE WEAKNESS: ICD-10-CM

## 2025-04-23 DIAGNOSIS — M54.42 CHRONIC BILATERAL LOW BACK PAIN WITH BILATERAL SCIATICA: Primary | ICD-10-CM

## 2025-04-23 DIAGNOSIS — M54.6 CHRONIC MIDLINE THORACIC BACK PAIN: ICD-10-CM

## 2025-04-23 DIAGNOSIS — M54.41 CHRONIC BILATERAL LOW BACK PAIN WITH BILATERAL SCIATICA: Primary | ICD-10-CM

## 2025-04-23 PROCEDURE — 97161 PT EVAL LOW COMPLEX 20 MIN: CPT | Mod: GP

## 2025-04-23 PROCEDURE — 97110 THERAPEUTIC EXERCISES: CPT | Mod: GP

## 2025-04-23 ASSESSMENT — PATIENT HEALTH QUESTIONNAIRE - PHQ9
2. FEELING DOWN, DEPRESSED OR HOPELESS: NOT AT ALL
SUM OF ALL RESPONSES TO PHQ9 QUESTIONS 1 AND 2: 0
1. LITTLE INTEREST OR PLEASURE IN DOING THINGS: NOT AT ALL

## 2025-04-23 NOTE — PROGRESS NOTES
"Physical Therapy Evaluation    Patient Name: Milena Vidal \"[place]ia"  MRN: [id number]  Language: English-speaking  Today's Date: 4/23/2025  Time Calculation  Start Time: 1008  Stop Time: 1050  Time Calculation (min): 42 min  PT Evaluation Time Entry  PT Evaluation (Low) Time Entry: 32  PT Therapeutic Procedures Time Entry  Therapeutic Exercise Time Entry: 10        Insurance: [facility], $20 COPAY  No authorization required by insurance after initial evaluation  Primary diagnosis: low back pain  Visit # 1    Assessment  Milena Vidal \"[place]ia" is a 64 y.o. referred for thoracic and lumbar back pain who presents with chronic symptoms located midline thoracic spine, midline lumbar spine with intermittent radiation of symptoms posterior bilateral lower extremities to ankles. Patient demonstrates concordant pain with thoracolumbar AROM, strength deficits, as well as aberrant movements with return from lumbar flexion. At this time, patient is limited with bending, lifting, sitting > 30 mins and she is unable to participate in work duties at this time. Patient would benefit from PT interventions to address the stated impairments, improve functional mobility, establish HEP, and assist with management of chronic symptoms.    Plan   Treatment/Interventions: Cryotherapy, Education/ Instruction, Hot pack, Manual therapy, Neuromuscular re-education, Therapeutic activities, Therapeutic exercises  PT Plan: Skilled PT  PT Frequency: 1-2 times per week  Duration: 8 visits  Rehab Potential: Good  Plan of Care Agreement: Patient    The physical therapy prognosis is Good for the patient to achieve their goals. The patient tolerated therapy treatment today well with no adverse effects. Clinical presentation: stable. Level of clinical decision making is low.  Personal factors that may impact therapy include: chronic symptoms       Current Problem  1. Chronic bilateral low back pain with bilateral sciatica  Follow Up In " Physical Therapy      2. Muscle weakness  Referral to Physical Therapy      3. Chronic midline thoracic back pain  Follow Up In Physical Therapy        Problem List Items Addressed This Visit           ICD-10-CM    Low back pain - Primary M54.50    Relevant Orders    Follow Up In Physical Therapy    Chronic midline thoracic back pain M54.6, G89.29    Relevant Orders    Follow Up In Physical Therapy     Other Visit Diagnoses         Codes      Muscle weakness     M62.81            General:  General  Reason for Referral: Diagnosis  M51.34 (ICD-10-CM) - Degenerative disc disease, thoracic  M51.360 (ICD-10-CM) - Degeneration of intervertebral disc of lumbar region with discogenic back pain  M41.9 (ICD-10-CM) - Scoliosis of thoracic spine, unspecified scoliosis type  Referred By: Brandy Faulkner MD  Precautions:  Precautions  STEADI Fall Risk Score (The score of 4 or more indicates an increased risk of falling): 2  Precautions Comment: per patient: no bending, wiping, vacuuming, stooping    Subjective:  Chief complaints: back pain with sitting, stooping, standing, bending, lifting. Exercises in the morning before getting up, but then goes to bend forward to take mouthguard out in the morning and gets sharp, stabbing pain between the shoulder blades. Low back pain stays constant.  Mechanism of Injury/Onset Date: chronic worsening pain from bending and heavy lifting, started around 01/2025. History of back pain since 1994.  Referred for PT: 04/07/2025  Previous History: tried heat in the past (helps)  Personal Factors that may impact care: chronic symptoms   Patient is cleared for physical therapy services by physician referral. Discussed concerns on intake forms. Patient is following up with medical providers to address.     Pain:  Current: 8/10 Best: 8/10 Worst: 8/10   Location: points to - mid thoracic spine, mid lumbar spine to bilateral gluteal, posterior LE to calf   Type: sharp, stabbing, uncomfortable, like its going  "to immobilize me    Aggravators: bending, stooping, lifting   Alleviators: Tylenol   Numbness/tingling: no, but pain intermittent radiates to the ankles    Function:   Work/Recreation: , off work currently   Prior Level: chronic, constant back pain   Current limitations: bending, lifting, sitting > 30 mins   Condition: Worsening     Home Situation:     Stairs: 2 flights with handrail support              Lives with     Sleep: Preferred position(s): sidelying      Goals for Therapy:    Get stronger and out of pain, back to work    Objective   Gait: Indep ambulation, decreased kristen  Posture: mild stooping positioning in standing; forward head, rounded shoulders, decreased lumbar lordosis in sitting    Lumbar AROM:  Flexion: Fingertips to 2\" proximal to malleoli, 8/10 mid lumbar spine  Extension: nil limitation, 8/10 mid lumbar spine, gluteal B to ankles posteriorly  Side Bend R: fingertips to joint line,  8/10 mid lumbar spine  Side Bend L: 8/10 mid lumbar spine    Thoracic AROM:  Flexion: nil limitation, 5/10 mid lumbar spine  Extension: min limitation, 7/10 mid lumbar spine  Side Bend R: nil limitation, no change  Side Bend L: nil limitation, no change  Rotation R: nil limitation, no change  Rotation L: nil limitation, no change    Hip Strength:  Flexion: 4+/5 B  Abduction: 3+/5  Adduction: 4-/5  Bridge endurance: 4 sec    Knee Strength:  Flexion: 5/5 B  Extension: 5/5 B    Ankle & Foot Strength:  Dorsiflexion: 5/5 B    Special Tests:  (+) Aberrant movement with return from lumbar flexion  (-) slump, SLR      Patient intermittently holding breath with independent transfers, bed mobility, ROM and strength testing    Outcome Measures:  Modified Oswestry: 50% disability    Treatment:  Therapeutic Exercise: Education on posture, positioning with use of lumbar roll, modification or discontinuation of any exercise that increases symptoms (i.e.peripheralization of symptoms).  Avoid valsalva/ breath " holding, as patient with tendency to hold breath with therapeutic exercises and transitional movements  Hooklying TA x 10 reps  Hooklying hip adduction 2 x 10 reps  Hooklying hip clamshells with green TB 2 x 10 reps    OP Education: verbal, demonstration, HEP handout + green TB    Access Code: [email or web address]  URL: [email or web address]  Date: 04/23/2025  Prepared by: Cheryl    Exercises  - Hooklying Transversus Abdominis Palpation  - 1 x daily - 7 x weekly - 10 reps  - Supine Hip Adduction Isometric with Ball  - 1 x daily - 7 x weekly - 2 sets - 10 reps  - Hooklying Clamshell with Resistance  - 1 x daily - 7 x weekly - 2 sets - 10 reps    Goals:  Active       PT Problem       Patient will demonstrate Good body mechanics with bending/lifting to improve symptoms with home and work duties       Start:  04/23/25    Expected End:  07/22/25            Patient will return from forward flexed position Independently without use of upper extremity support or aberrant movement to demonstrate increased strength        Start:  04/23/25    Expected End:  07/22/25            Patient will improve hip by >=1/3 MMT, demonstrate >10 seconds on the bridge endurance to demonstrate strength and assist with reduction of pain/symptoms       Start:  04/23/25    Expected End:  07/22/25            Patient will sit, bend/lift without adverse reaction on average, pain no greater than 6/10 at worst       Start:  04/23/25    Expected End:  07/22/25            Patient will complete lumbar AROM without distal radiation of symptoms to increase ease with daily functional mobiltiy       Start:  04/23/25    Expected End:  07/22/25            Patient will demonstrate independence and compliance with HEP and self management        Start:  04/23/25    Expected End:  07/22/25            Patient will demonstrate a 10% improvement on Modified Oswestry to demonstrate increased functional mobility        Start:  04/23/25    Expected End:   07/22/25

## 2025-04-23 NOTE — TELEPHONE ENCOUNTER
Pt stopped by earlier with concern about FMLA paperwork .  I was not able to talk to her when she was here .     I called and discussed her concerns  . I intended to put her on Intermittent Leave     But they put her on a continuous leave. I requested  name/ phone .   [phone]  Kenya Sheridan , HR

## 2025-04-25 NOTE — TELEPHONE ENCOUNTER
Called,  not available till 4/28/25  .    Pt emailed me a few times about the same.  Sent her an update .

## 2025-04-30 ENCOUNTER — DOCUMENTATION (OUTPATIENT)
Dept: PHYSICAL THERAPY | Facility: CLINIC | Age: 65
End: 2025-04-30
Payer: COMMERCIAL

## 2025-04-30 ENCOUNTER — APPOINTMENT (OUTPATIENT)
Dept: PHYSICAL THERAPY | Facility: CLINIC | Age: 65
End: 2025-04-30
Payer: COMMERCIAL

## 2025-04-30 NOTE — PATIENT COMMUNICATION
Please send copy of the FMLA form that I addended today , to the patient in [facility] .

## 2025-04-30 NOTE — PROGRESS NOTES
"Physical Therapy                 Therapy Communication Note    Patient Name: Milena Vidal \"Coty\"  MRN: [id number]  Today's Date: 4/30/2025     Discipline: Physical Therapy      Missed Visit Reason:  Cancel    Missed Time: Cancel    "

## 2025-04-30 NOTE — PROGRESS NOTES
"Physical Therapy    Physical Therapy Treatment    Patient Name: Milena Vidal \"[place]"  MRN: [id number]  Today's Date: 4/30/2025                 Insurance: Medical [facility] [place], $20 COPAY  No authorization required by insurance after initial evaluation  Primary diagnosis: low back pain  Visit # 2      Assessment:  Patient returns for first follow up visit.      Plan:      Current Problem  No diagnosis found.  Problem List Items Addressed This Visit    None      General   04/23/2025 Evaluation \"referred for thoracic and lumbar back pain who presents with chronic symptoms located midline thoracic spine, midline lumbar spine with intermittent radiation of symptoms posterior bilateral lower extremities to ankles.\"    Precautions:   Precautions  STEADI Fall Risk Score (The score of 4 or more indicates an increased risk of falling): 2  Precautions Comment: per patient: no bending, wiping, vacuuming, stooping    Subjective      Pain    points to - mid thoracic spine, mid lumbar spine to bilateral gluteal, posterior LE to calf     Objective       Treatments:      OP EDUCATION: verbal, demonstration, HEP handout       Goals:  Active       PT Problem       Patient will demonstrate Good body mechanics with bending/lifting to improve symptoms with home and work duties       Start:  04/23/25    Expected End:  07/22/25            Patient will return from forward flexed position Independently without use of upper extremity support or aberrant movement to demonstrate increased strength        Start:  04/23/25    Expected End:  07/22/25            Patient will improve hip by >=1/3 MMT, demonstrate >10 seconds on the bridge endurance to demonstrate strength and assist with reduction of pain/symptoms       Start:  04/23/25    Expected End:  07/22/25            Patient will sit, bend/lift without adverse reaction on average, pain no greater than 6/10 at worst       Start:  04/23/25    Expected End:  07/22/25            Patient will " complete lumbar AROM without distal radiation of symptoms to increase ease with daily functional mobiltiy       Start:  04/23/25    Expected End:  07/22/25            Patient will demonstrate independence and compliance with HEP and self management        Start:  04/23/25    Expected End:  07/22/25            Patient will demonstrate a 10% improvement on Modified Oswestry to demonstrate increased functional mobility        Start:  04/23/25    Expected End:  07/22/25

## 2025-05-01 ENCOUNTER — TELEMEDICINE (OUTPATIENT)
Dept: PRIMARY CARE | Facility: CLINIC | Age: 65
End: 2025-05-01
Payer: COMMERCIAL

## 2025-05-01 ENCOUNTER — APPOINTMENT (OUTPATIENT)
Dept: PRIMARY CARE | Facility: CLINIC | Age: 65
End: 2025-05-01
Payer: COMMERCIAL

## 2025-05-01 ENCOUNTER — APPOINTMENT (OUTPATIENT)
Dept: PHYSICAL THERAPY | Facility: CLINIC | Age: 65
End: 2025-05-01
Payer: COMMERCIAL

## 2025-05-01 DIAGNOSIS — M51.360 DEGENERATION OF INTERVERTEBRAL DISC OF LUMBAR REGION WITH DISCOGENIC BACK PAIN: ICD-10-CM

## 2025-05-01 DIAGNOSIS — M51.34 DEGENERATIVE DISC DISEASE, THORACIC: Primary | ICD-10-CM

## 2025-05-01 PROCEDURE — 99213 OFFICE O/P EST LOW 20 MIN: CPT | Performed by: FAMILY MEDICINE

## 2025-05-01 ASSESSMENT — ENCOUNTER SYMPTOMS
WEAKNESS: 0
LEG PAIN: 1
PARESTHESIAS: 0
BOWEL INCONTINENCE: 0
NUMBNESS: 0
ABDOMINAL PAIN: 0
WEIGHT LOSS: 0
PERIANAL NUMBNESS: 0
PARESIS: 0
HEADACHES: 1
DYSURIA: 0
TINGLING: 0
FEVER: 0
BACK PAIN: 1

## 2025-05-01 NOTE — PROGRESS NOTES
" Subjective   Patient ID: Mielna Vidal \"[place]" is a 64 y.o. female who presents for No chief complaint on file..  HPI  Virtual or Telephone Consent    An interactive audio and video telecommunication system which permits real time communications between the patient (at the originating site) and provider (at the distant site) was utilized to provide this telehealth service.   Verbal consent was requested and obtained from Milena Vidal on this date, 05/01/25 for a telehealth visit and the patient's location was confirmed at the time of the visit.    1 month follow up of multiple areas of Pain   Headaches     Back pain - thoracic, lumbar  (thierry first thing in the morning)   Shoulder pain   ?Parkinsonism - was to  make appt.      Treatment for pain   -- Acetaminophen  650 mg   .  Take 1 pill twice a day   -- lidocaine  Patch .  Leave on for 10- 12 hrs  .( Has not started)   -- Physical  therapy      Evaluation for pain   Labwork    ESR elevated just lightly.   Imaging   Cerv /Thor/Lumbar -- Degenerative changes   Review of Systems    Shoulder pain is melissa,r nearly gone.   Back pain in upper and lower back the same,  has had 2 visits of PT  so far.   Headaches- about the same,     Possible parkinsonism -future appt with Specialist has been made     Objective   LMP  (LMP Unknown)     Physical Exam    Can move neck side to side(rotation ) without shoulder pain     Assessment/Plan   Problem List Items Addressed This Visit          Medium    Degeneration of intervertebral disc of lumbar region with discogenic back pain    Degenerative disc disease, thoracic - Primary       Minimal change in sxs     Increase Tylenol ES to 1 pill 2 x a day   Use lidocaine patch at night on neck  area .     Continue  w the PT  .     July appt to reassess.   In person     FMLA questions-addressed to the best of my ability.  She is wondering why she has not been  called back to work .  Recommend she call her employer/ hr " representative directly.   There is not more I would add to or addend on the [facility] paperwork .     JON Faulkner MD

## 2025-05-02 ENCOUNTER — APPOINTMENT (OUTPATIENT)
Dept: PHYSICAL THERAPY | Facility: CLINIC | Age: 65
End: 2025-05-02
Payer: COMMERCIAL

## 2025-05-09 ENCOUNTER — APPOINTMENT (OUTPATIENT)
Dept: PHYSICAL THERAPY | Facility: CLINIC | Age: 65
End: 2025-05-09
Payer: COMMERCIAL

## 2025-05-09 ENCOUNTER — DOCUMENTATION (OUTPATIENT)
Dept: PHYSICAL THERAPY | Facility: CLINIC | Age: 65
End: 2025-05-09
Payer: COMMERCIAL

## 2025-05-09 NOTE — PROGRESS NOTES
"Physical Therapy                 Therapy Communication Note    Patient Name: Milena Vidal \"Coty\"  MRN: [id number]  Today's Date: 5/9/2025     Discipline: Physical Therapy      Missed Visit Reason:  Cancel via MyChart    Missed Time: Cancel    "

## 2025-05-16 ENCOUNTER — TREATMENT (OUTPATIENT)
Dept: PHYSICAL THERAPY | Facility: CLINIC | Age: 65
End: 2025-05-16
Payer: COMMERCIAL

## 2025-05-16 DIAGNOSIS — M54.6 CHRONIC MIDLINE THORACIC BACK PAIN: ICD-10-CM

## 2025-05-16 DIAGNOSIS — G89.29 CHRONIC BILATERAL LOW BACK PAIN WITH BILATERAL SCIATICA: Primary | ICD-10-CM

## 2025-05-16 DIAGNOSIS — G89.29 CHRONIC MIDLINE THORACIC BACK PAIN: ICD-10-CM

## 2025-05-16 DIAGNOSIS — M54.41 CHRONIC BILATERAL LOW BACK PAIN WITH BILATERAL SCIATICA: Primary | ICD-10-CM

## 2025-05-16 DIAGNOSIS — M54.42 CHRONIC BILATERAL LOW BACK PAIN WITH BILATERAL SCIATICA: Primary | ICD-10-CM

## 2025-05-16 DIAGNOSIS — M62.81 MUSCLE WEAKNESS: ICD-10-CM

## 2025-05-16 PROCEDURE — 97110 THERAPEUTIC EXERCISES: CPT | Mod: GP

## 2025-05-16 NOTE — PROGRESS NOTES
"Physical Therapy    Physical Therapy Treatment    Patient Name: Milena Vidal \"[place]ia"  MRN: [id number]  Today's Date: 5/16/2025  Time Calculation  Start Time: 1048  Stop Time: 1130  Time Calculation (min): 42 min     PT Therapeutic Procedures Time Entry  Therapeutic Exercise Time Entry: 42        Insurance: Medical [facility], $20 COPAY  No authorization required by insurance after initial evaluation  Primary diagnosis: low back pain  Visit # 2      Assessment:  Patient returns for first follow up visit. Reassessed repeated motions, no centralization of symptoms with repeated lumbar motions in standing; however, prone lying, prone lumbar extension abolishes symptoms. Patient to complete between sessions; plan to assess response at next visit. Instructed in body mechanics with bending/lifting/daily functional mobility to assist with management of symptoms at work (). Plan to ensure Good carry over at next visit.    Plan:  Continue per initial PT POC towards all goals to improve mobility, strength, and assist with reduction of symptoms.    Current Problem  1. Chronic bilateral low back pain with bilateral sciatica  Follow Up In Physical Therapy      2. Chronic midline thoracic back pain  Follow Up In Physical Therapy      3. Muscle weakness          Problem List Items Addressed This Visit           ICD-10-CM    Low back pain - Primary M54.50    Chronic midline thoracic back pain M54.6, G89.29     Other Visit Diagnoses         Codes      Muscle weakness     M62.81            General   04/23/2025 Evaluation \"referred for thoracic and lumbar back pain who presents with chronic symptoms located midline thoracic spine, midline lumbar spine with intermittent radiation of symptoms posterior bilateral lower extremities to ankles.\"    Precautions:   Precautions  STEADI Fall Risk Score (The score of 4 or more indicates an increased risk of falling): 2  Precautions Comment: per patient: no bending, wiping, " vacuuming, stooping    Subjective    I was doing good until I went back to work on Wednesday.    Pain  7-8/10 across my back and on my L buttock and back of the thigh    Objective   Decreased piriformis    Baseline: Lumbar paraspinals, L buttock to posterior knee 5/10  Repeated Lumbar ROM:   Flexion: Lumbar paraspinals, L buttock to posterior knee 5/10  Extension: Lumbar paraspinals, L buttock to posterior knee 5/10  R SB: 4/10 Lumbar paraspinals, L lateral knee  L SB: no change  Prone lying, prone press ups on elbows, abolishes symptoms    Treatments:  Therapeutic Exercise: reviewed posture, positioning with use of lumbar roll, modification or discontinuation of any exercise that increases symptoms (i.e.peripheralization of symptoms). Avoid valsalva/ breath holding, as patient with tendency to hold breath with therapeutic exercises and transitional movements.  Scifit stepper x 5 mins ROM/warm-up/strength  Step stretch L posterior x 10 reps  Seated anterior pelvic tilt x 10 reps  Prone lying   Prone press ups on forearms 2 x 10 reps   Prone hip extension x 10 reps   Hooklying TA x 10 reps  Hooklying hip adduction 3 x 10 reps  Hooklying hip clamshells with green TB 2 x 15 reps  Instructed in body mechanics with bending/lifting/daily functional mobility to assist with management of symptoms at work () + handout provided     OP Education: verbal, demonstration  Prone, prone press ups on forearm when radiation of symptoms arise     Access Code: [email or web address]  URL: [email or web address]  Date: 04/23/2025  Prepared by: Cheryl     Exercises  - Hooklying Transversus Abdominis Palpation  - 1 x daily - 7 x weekly - 10 reps  - Supine Hip Adduction Isometric with Ball  - 1 x daily - 7 x weekly - 2 sets - 10 reps  - Hooklying Clamshell with Resistance  - 1 x daily - 7 x weekly - 2 sets - 10 reps    Goals:  Active       PT Problem       Patient will demonstrate Good body mechanics with bending/lifting  to improve symptoms with home and work duties       Start:  04/23/25    Expected End:  07/22/25            Patient will return from forward flexed position Independently without use of upper extremity support or aberrant movement to demonstrate increased strength        Start:  04/23/25    Expected End:  07/22/25            Patient will improve hip by >=1/3 MMT, demonstrate >10 seconds on the bridge endurance to demonstrate strength and assist with reduction of pain/symptoms       Start:  04/23/25    Expected End:  07/22/25            Patient will sit, bend/lift without adverse reaction on average, pain no greater than 6/10 at worst       Start:  04/23/25    Expected End:  07/22/25            Patient will complete lumbar AROM without distal radiation of symptoms to increase ease with daily functional mobiltiy       Start:  04/23/25    Expected End:  07/22/25            Patient will demonstrate independence and compliance with HEP and self management        Start:  04/23/25    Expected End:  07/22/25            Patient will demonstrate a 10% improvement on Modified Oswestry to demonstrate increased functional mobility        Start:  04/23/25    Expected End:  07/22/25

## 2025-05-23 ENCOUNTER — TREATMENT (OUTPATIENT)
Dept: PHYSICAL THERAPY | Facility: CLINIC | Age: 65
End: 2025-05-23
Payer: COMMERCIAL

## 2025-05-23 DIAGNOSIS — G89.29 CHRONIC MIDLINE THORACIC BACK PAIN: ICD-10-CM

## 2025-05-23 DIAGNOSIS — M54.41 CHRONIC BILATERAL LOW BACK PAIN WITH BILATERAL SCIATICA: Primary | ICD-10-CM

## 2025-05-23 DIAGNOSIS — M54.42 CHRONIC BILATERAL LOW BACK PAIN WITH BILATERAL SCIATICA: Primary | ICD-10-CM

## 2025-05-23 DIAGNOSIS — M54.6 CHRONIC MIDLINE THORACIC BACK PAIN: ICD-10-CM

## 2025-05-23 DIAGNOSIS — G89.29 CHRONIC BILATERAL LOW BACK PAIN WITH BILATERAL SCIATICA: Primary | ICD-10-CM

## 2025-05-23 PROCEDURE — 97110 THERAPEUTIC EXERCISES: CPT | Mod: GP

## 2025-05-23 NOTE — PROGRESS NOTES
"Physical Therapy    Physical Therapy Treatment    Patient Name: Milena Vidal \"Coty\"  MRN: [id number]  Today's Date: 5/23/2025  Time Calculation  Start Time: 1045  Stop Time: 1125  Time Calculation (min): 40 min     PT Therapeutic Procedures Time Entry  Therapeutic Exercise Time Entry: 40        Insurance: Medical [facility], $20 COPAY  No authorization required by insurance after initial evaluation  Primary diagnosis: low back pain  Visit # 3      Assessment:  Patient returns to therapy and reports increased discomfort L adductors/hamstring region, unsure if squeezed too hard with home exercise; therefore, held today and patient to hold until next visit. She is tender to touch and mild pain is present with resisted L hip extension. She denies pain in prone lying. Body mechanics reviewed with bending/lifting floor to waist level with trash can to assist with management of symptoms at work (); minor cues for staying close to object. \"Minimal\" pain in the low back at end of treatment.    Plan:  Continue per initial PT POC towards all goals to improve mobility, strength, and assist with reduction of symptoms.    Current Problem  1. Chronic bilateral low back pain with bilateral sciatica  Follow Up In Physical Therapy      2. Chronic midline thoracic back pain  Follow Up In Physical Therapy          Problem List Items Addressed This Visit           ICD-10-CM    Low back pain - Primary M54.50    Chronic midline thoracic back pain M54.6, G89.29       General   04/23/2025 Evaluation \"referred for thoracic and lumbar back pain who presents with chronic symptoms located midline thoracic spine, midline lumbar spine with intermittent radiation of symptoms posterior bilateral lower extremities to ankles.\"    Precautions:   Precautions  STEADI Fall Risk Score (The score of 4 or more indicates an increased risk of falling): 2  Precautions Comment: per patient: no bending, wiping, vacuuming, stooping    Subjective "    I think I pulled a muscle, I think I squeezed too hard with the ball or too hard with the band, I don't know what I did. Two days ago, but worse this morning.  HEP 1x/day.    Pain  4-5/10 points to - lumbar paraspinals, adductors/hamstring region    Objective   (+) TTP L medial hamstring, adductor distally. Pain with L hip abduction  (-) pain with resisted L hip adductor,  L knee flexion  Minimal pain with resisted hip extension    Treatments:  Therapeutic Exercise: reviewed posture, positioning with use of lumbar roll, modification or discontinuation of any exercise that increases symptoms (i.e.peripheralization of symptoms). Avoid valsalva/ breath holding, as patient with tendency to hold breath with therapeutic exercises and transitional movements.  Scifit stepper x 5 mins ROM/warm-up/strength  Step stretch L posterior 2 x 10 reps  Core engagement with pull downs red theratube 2 x 10 reps   Core engagement with scapular retraction red theratube 2 x 10 reps  Seated anterior pelvic tilt 2 x 10 reps  Prone lying   Prone press ups on forearms 2 x 10 reps   Body mechanics with bending/lifting floor to waist level with trash can x 10 reps    Prone hip extension x 10 reps - held 05/23/2025  Hooklying hip adduction 3 x 10 reps - held 05/23/2025  Hooklying hip clamshells with green TB 2 x 15 reps - held 05/23/2025     OP Education: verbal, demonstration  Prone, prone press ups on forearm when radiation of symptoms arise     Access Code: [email or web address]  URL: [email or web address]  Date: 04/23/2025  Prepared by: Cheryl     Exercises  - Hooklying Transversus Abdominis Palpation  - 1 x daily - 7 x weekly - 10 reps  - Supine Hip Adduction Isometric with Ball  - 1 x daily - 7 x weekly - 2 sets - 10 reps  - Hooklying Clamshell with Resistance  - 1 x daily - 7 x weekly - 2 sets - 10 reps    Goals:  Active       PT Problem       Patient will demonstrate Good body mechanics with bending/lifting to improve  symptoms with home and work duties       Start:  04/23/25    Expected End:  07/22/25            Patient will return from forward flexed position Independently without use of upper extremity support or aberrant movement to demonstrate increased strength        Start:  04/23/25    Expected End:  07/22/25            Patient will improve hip by >=1/3 MMT, demonstrate >10 seconds on the bridge endurance to demonstrate strength and assist with reduction of pain/symptoms       Start:  04/23/25    Expected End:  07/22/25            Patient will sit, bend/lift without adverse reaction on average, pain no greater than 6/10 at worst       Start:  04/23/25    Expected End:  07/22/25            Patient will complete lumbar AROM without distal radiation of symptoms to increase ease with daily functional mobiltiy       Start:  04/23/25    Expected End:  07/22/25            Patient will demonstrate independence and compliance with HEP and self management        Start:  04/23/25    Expected End:  07/22/25            Patient will demonstrate a 10% improvement on Modified Oswestry to demonstrate increased functional mobility        Start:  04/23/25    Expected End:  07/22/25

## 2025-07-08 ENCOUNTER — OFFICE VISIT (OUTPATIENT)
Dept: URGENT CARE | Age: 65
End: 2025-07-08
Payer: COMMERCIAL

## 2025-07-08 VITALS
WEIGHT: 188 LBS | TEMPERATURE: 97 F | SYSTOLIC BLOOD PRESSURE: 140 MMHG | DIASTOLIC BLOOD PRESSURE: 79 MMHG | RESPIRATION RATE: 20 BRPM | HEART RATE: 95 BPM | BODY MASS INDEX: 34.6 KG/M2 | OXYGEN SATURATION: 95 % | HEIGHT: 62 IN

## 2025-07-08 DIAGNOSIS — M54.9 ACUTE LEFT-SIDED BACK PAIN, UNSPECIFIED BACK LOCATION: ICD-10-CM

## 2025-07-08 DIAGNOSIS — R31.9 HEMATURIA, UNSPECIFIED TYPE: Primary | ICD-10-CM

## 2025-07-08 DIAGNOSIS — R10.32 ACUTE LEFT LOWER QUADRANT PAIN: ICD-10-CM

## 2025-07-08 DIAGNOSIS — R10.9 FLANK PAIN: ICD-10-CM

## 2025-07-08 LAB
POC APPEARANCE, URINE: ABNORMAL
POC BILIRUBIN, URINE: NEGATIVE
POC BLOOD, URINE: ABNORMAL
POC COLOR, URINE: YELLOW
POC GLUCOSE, URINE: NEGATIVE MG/DL
POC KETONES, URINE: NEGATIVE MG/DL
POC LEUKOCYTES, URINE: NEGATIVE
POC NITRITE,URINE: NEGATIVE
POC PH, URINE: 6 PH
POC PROTEIN, URINE: NEGATIVE MG/DL
POC SPECIFIC GRAVITY, URINE: 1.01
POC UROBILINOGEN, URINE: 0.2 EU/DL

## 2025-07-08 ASSESSMENT — ENCOUNTER SYMPTOMS
ABDOMINAL DISTENTION: 0
ABDOMINAL PAIN: 1
NAUSEA: 0
NEUROLOGICAL NEGATIVE: 1
BLOOD IN STOOL: 0
CARDIOVASCULAR NEGATIVE: 1
VOMITING: 0
EYES NEGATIVE: 1
PSYCHIATRIC NEGATIVE: 1
ANAL BLEEDING: 0
RESPIRATORY NEGATIVE: 1
CONSTITUTIONAL NEGATIVE: 1
FLANK PAIN: 1
DYSURIA: 0

## 2025-07-08 NOTE — PROGRESS NOTES
"Subjective   Patient ID: Milena Vidal \"[place]" is a 64 y.o. female. They present today with a chief complaint of Abdominal Pain and Flank Pain (X 1 day).    History of Present Illness  H/o colitis, diverticulitis and kidney stones. C/o LLQ pain, back pain and flank pain x 1-2 day(s). Patient denies any CP, SOB, HA, fever, and nausea/vomiting otherwise.      History provided by:  Patient  Abdominal Pain  Pertinent negatives include no dysuria, nausea or vomiting.   Flank Pain  Associated symptoms: abdominal pain    Associated symptoms: no nausea and no vomiting        Past Medical History  Allergies as of 07/08/2025 - Reviewed 07/08/2025   Allergen Reaction Noted    Nabumetone Unknown 05/16/2023    Nitrofurantoin monohyd/m-cryst Unknown 05/16/2023    Phenylephrine-acetaminophen-gg Hives 06/13/2024       Prescriptions Prior to Admission[1]     Medical History[2]    Surgical History[3]     reports that she has never smoked. She has never used smokeless tobacco. She reports current alcohol use of about 1.0 standard drink of alcohol per week. She reports that she does not use drugs.    Review of Systems  Review of Systems   Constitutional: Negative.    HENT: Negative.     Eyes: Negative.    Respiratory: Negative.     Cardiovascular: Negative.    Gastrointestinal:  Positive for abdominal pain. Negative for abdominal distention, anal bleeding, blood in stool, nausea and vomiting.   Genitourinary:  Positive for flank pain. Negative for dysuria, urgency, vaginal bleeding, vaginal discharge and vaginal pain.   Skin: Negative.    Neurological: Negative.    Psychiatric/Behavioral: Negative.     All other systems reviewed and are negative.                                 Objective    Vitals:    07/08/25 1618   BP: 140/79   Pulse: 95   Resp: 20   Temp: 36.1 °C (97 °F)   TempSrc: Oral   SpO2: 95%   Weight: 85.3 kg (188 lb)   Height: 1.575 m (5' 2\")     No LMP recorded (lmp unknown). Patient is postmenopausal.    Physical " Exam  Vitals and nursing note reviewed.   Constitutional:       General: She is not in acute distress.     Appearance: Normal appearance. She is not ill-appearing, toxic-appearing or diaphoretic.   HENT:      Head: Normocephalic and atraumatic.      Nose: Nose normal.      Mouth/Throat:      Mouth: Mucous membranes are moist.      Pharynx: Oropharynx is clear.   Eyes:      Extraocular Movements: Extraocular movements intact.      Pupils: Pupils are equal, round, and reactive to light.   Cardiovascular:      Rate and Rhythm: Normal rate and regular rhythm.      Pulses: Normal pulses.      Heart sounds: Normal heart sounds.   Pulmonary:      Effort: Pulmonary effort is normal.      Breath sounds: Normal breath sounds.   Abdominal:      General: Abdomen is flat. Bowel sounds are normal. There is no distension.      Palpations: Abdomen is soft. There is no mass.      Tenderness: There is abdominal tenderness in the suprapubic area and left lower quadrant. There is no right CVA tenderness, left CVA tenderness, guarding or rebound. Negative signs include Saravia's sign, Rovsing's sign, McBurney's sign, psoas sign and obturator sign.   Skin:     General: Skin is warm and dry.   Neurological:      General: No focal deficit present.      Mental Status: She is alert and oriented to person, place, and time.   Psychiatric:         Mood and Affect: Mood normal.         Behavior: Behavior normal.         Procedures    Point of Care Test & Imaging Results from this visit  Results for orders placed or performed in visit on 07/08/25   POCT UA Automated manually resulted   Result Value Ref Range    POC Color, Urine Yellow Straw, Yellow, Light-Yellow    POC Appearance, Urine Cloudy (A) Clear    POC Glucose, Urine NEGATIVE NEGATIVE mg/dl    POC Bilirubin, Urine NEGATIVE NEGATIVE    POC Ketones, Urine NEGATIVE NEGATIVE mg/dl    POC Specific Gravity, Urine 1.015 1.005 - 1.035    POC Blood, Urine LARGE (3+) (A) NEGATIVE    POC PH, Urine  6.0 No Reference Range Established PH    POC Protein, Urine NEGATIVE NEGATIVE mg/dl    POC Urobilinogen, Urine 0.2 0.2, 1.0 EU/DL    Poc Nitrite, Urine NEGATIVE NEGATIVE    POC Leukocytes, Urine NEGATIVE NEGATIVE      Imaging  No results found.    Cardiology, Vascular, and Other Imaging  No other imaging results found for the past 2 days      Diagnostic study results (if any) were reviewed by MELANIE Landry.    Assessment/Plan   Allergies, medications, history, and pertinent labs/EKGs/Imaging reviewed by MELANIE Landry.     Medical Decision Making  UA pos for hematuria. H/o colitis, diverticulitis and kidney stones. Pos LLQ pain on exam. Advised further eval/higher level of care in the ED. She is agreeable and will go to [facility] ED. Signed AMA to self-transport by . Left in stable condition.     Orders and Diagnoses  Diagnoses and all orders for this visit:  Hematuria, unspecified type  Acute left lower quadrant pain  Acute left-sided back pain, unspecified back location  Flank pain  -     POCT UA Automated manually resulted      Medical Admin Record      Patient disposition: ED    Electronically signed by MELANIE Landry  5:11 PM       [1] (Not in a hospital admission)   [2]   Past Medical History:  Diagnosis Date    Achilles tendinitis, left leg 10/02/2020    Achilles tendinitis of left lower extremity    Acute maxillary sinusitis, unspecified 09/23/2017    Acute maxillary sinusitis    Anxiety     Anxiety disorder, unspecified 04/18/2017    Anxiety    Arthritis     Dietary counseling and surveillance 10/25/2016    Dietary counseling    Encounter for immunization 10/20/2017    Immunization due    Encounter for immunization 10/19/2015    Immunization due    GERD (gastroesophageal reflux disease)     Hypersensitivity angiitis (Multi) 01/13/2025    Hypertension 2013    Hypoxemia 02/06/2018    Hypoxia    Influenza due to other identified influenza virus with other respiratory  manifestations 02/06/2018    Influenza A    Left knee pain 12/13/2023    Low back pain 04/12/2023    Other conditions influencing health status 02/06/2018    History of cough    Other malaise 02/16/2015    Malaise and fatigue    Pain in left shoulder 10/15/2014    Left shoulder pain    Pain in leg, unspecified 08/18/2015    Leg pain    Pain in right wrist 10/19/2015    Right wrist pain    Pain in unspecified ankle and joints of unspecified foot 08/18/2015    Ankle pain    Pain in unspecified knee 08/18/2015    Knee pain    Personal history of other diseases of the female genital tract 07/07/2017    History of vaginal discharge    Personal history of other diseases of the female genital tract 02/23/2017    History of vaginitis    Personal history of other diseases of the female genital tract 01/19/2015    History of irregular menstrual cycles    Personal history of other diseases of the nervous system and sense organs 12/28/2017    History of conjunctivitis    Personal history of other diseases of the nervous system and sense organs 02/09/2016    History of restless legs syndrome    Personal history of other diseases of the respiratory system 02/21/2017    History of acute sinusitis    Personal history of other diseases of the respiratory system 02/21/2017    History of acute bronchitis    Personal history of other diseases of urinary system 10/20/2017    History of hematuria    Personal history of other endocrine, nutritional and metabolic disease 10/27/2021    History of hypokalemia    Personal history of other specified conditions 02/03/2016    History of snoring    Personal history of other specified conditions 02/18/2015    History of nausea    Personal history of other specified conditions 03/23/2015    History of weight gain    Personal history of other specified conditions 03/23/2015    History of headache    Personal history of other specified conditions 10/20/2017    History of urinary frequency    Personal  history of other specified conditions 2014    History of syncope    Personal history of urinary calculi 2017    History of renal calculi    Personal history of urinary calculi 2019    History of kidney stones    Poor memory 2023    Per  ,           Rash and other nonspecific skin eruption 2018    Rash    Rash and other nonspecific skin eruption 2018    Rash    Snoring 2015    Snoring    Solitary pulmonary nodule 2021    Lung nodule    Tension-type headache, unspecified, not intractable 10/05/2015    Muscle tension headache    Unspecified abnormal findings in urine     Foul smelling urine    Unspecified injury of unspecified ankle, initial encounter 2015    Injury, ankle    Unspecified injury of unspecified lower leg, initial encounter 2015    Knee injury    Vesical tenesmus 2017    Painful bladder spasm    Wheezing 2018    Wheezing symptom   [3]   Past Surgical History:  Procedure Laterality Date     SECTION, CLASSIC  2014     Section     SECTION, LOW TRANSVERSE      COLONOSCOPY  2014    Complete Colonoscopy

## 2025-07-09 ENCOUNTER — TELEPHONE (OUTPATIENT)
Dept: PRIMARY CARE | Facility: CLINIC | Age: 65
End: 2025-07-09

## 2025-07-09 NOTE — TELEPHONE ENCOUNTER
Patient stated that she went to the er was told that she has a 7 mm kidney stone and she needs a stint also but needs a referral to a neurologist, just needed to know if you could refer her to one

## 2025-07-14 DIAGNOSIS — F41.9 ANXIETY DISORDER, UNSPECIFIED TYPE: ICD-10-CM

## 2025-07-14 RX ORDER — CITALOPRAM 20 MG/1
20 TABLET ORAL DAILY
Qty: 90 TABLET | Refills: 3 | Status: SHIPPED | OUTPATIENT
Start: 2025-07-14

## 2025-07-14 NOTE — TELEPHONE ENCOUNTER
Unclear what patient is asking. Does she need urology or neurology referral? I reviewed her ER visits and looks like 7/8/25 they referred her to Urology and she has an appt with Dr John Andres on 7/28/25 at [facility]. On a separate note, looks like she has an appt at  Neurology on 9/30/25.     Can you call patient and see if she still needs referrals as it looks like she is set up with appts for both urology and neurology?    Zenia Shah DO, [place]  [facility]  Office: [phone]  7/14/2025 5:35 PM

## 2025-07-15 ENCOUNTER — APPOINTMENT (OUTPATIENT)
Dept: PRIMARY CARE | Facility: CLINIC | Age: 65
End: 2025-07-15
Payer: COMMERCIAL

## 2025-07-15 NOTE — TELEPHONE ENCOUNTER
The pt was in need of both referrals. I read the information to her in regards to the appointments and dates. She is aware, she was wanting to get into Urology sooner. I attempted to get her to [facility] to see if [facility] would have something sooner. She said she is on a waitlist with [facility] and she would see if something becomes available. She asked if a provider here could place a stent, and I explained we dont do that here. She said she would leave things as is for now.

## 2025-07-24 DIAGNOSIS — I10 ESSENTIAL HYPERTENSION: ICD-10-CM

## 2025-07-24 RX ORDER — VERAPAMIL HYDROCHLORIDE 180 MG/1
180 TABLET, EXTENDED RELEASE ORAL DAILY
Qty: 90 TABLET | Refills: 3 | Status: SHIPPED | OUTPATIENT
Start: 2025-07-24

## 2025-07-24 NOTE — TELEPHONE ENCOUNTER
BRIEF NOTE    Subjective/Objective:  Pharmacy refill request.     Assessment/Plan:  1. Essential hypertension  - verapamil SR (Calan-SR) 180 mg ER tablet; Take 1 tablet (180 mg) by mouth once daily.  Dispense: 90 tablet; Refill: 3      No problem-specific Assessment & Plan notes found for this encounter.        Zenia Shah DO, MSEd  [facility]  Office: [phone]  7/24/2025 6:16 PM

## 2025-08-04 ENCOUNTER — OFFICE (OUTPATIENT)
Dept: URBAN - METROPOLITAN AREA CLINIC 26 | Facility: CLINIC | Age: 65
End: 2025-08-04
Payer: COMMERCIAL

## 2025-08-04 VITALS
HEART RATE: 86 BPM | TEMPERATURE: 98 F | WEIGHT: 186 LBS | HEIGHT: 62 IN | SYSTOLIC BLOOD PRESSURE: 131 MMHG | DIASTOLIC BLOOD PRESSURE: 81 MMHG

## 2025-08-04 DIAGNOSIS — K62.5 HEMORRHAGE OF ANUS AND RECTUM: ICD-10-CM

## 2025-08-04 DIAGNOSIS — R19.4 CHANGE IN BOWEL HABIT: ICD-10-CM

## 2025-08-04 PROCEDURE — 99214 OFFICE O/P EST MOD 30 MIN: CPT | Performed by: INTERNAL MEDICINE

## 2025-08-11 ENCOUNTER — DOCUMENTATION (OUTPATIENT)
Dept: PHYSICAL THERAPY | Facility: CLINIC | Age: 65
End: 2025-08-11
Payer: COMMERCIAL

## 2025-08-11 DIAGNOSIS — E78.5 HYPERLIPIDEMIA, UNSPECIFIED HYPERLIPIDEMIA TYPE: ICD-10-CM

## 2025-08-13 RX ORDER — ATORVASTATIN CALCIUM 40 MG/1
40 TABLET, FILM COATED ORAL DAILY
Qty: 90 TABLET | Refills: 1 | Status: SHIPPED | OUTPATIENT
Start: 2025-08-13

## 2025-08-19 DIAGNOSIS — K21.9 GASTROESOPHAGEAL REFLUX DISEASE WITHOUT ESOPHAGITIS: ICD-10-CM

## 2025-08-19 RX ORDER — PANTOPRAZOLE SODIUM 40 MG/1
40 TABLET, DELAYED RELEASE ORAL
Qty: 90 TABLET | Refills: 3 | Status: SHIPPED | OUTPATIENT
Start: 2025-08-19

## 2025-09-12 ENCOUNTER — APPOINTMENT (OUTPATIENT)
Dept: PRIMARY CARE | Facility: CLINIC | Age: 65
End: 2025-09-12

## 2025-09-30 ENCOUNTER — APPOINTMENT (OUTPATIENT)
Dept: NEUROLOGY | Facility: CLINIC | Age: 65
End: 2025-09-30
Payer: COMMERCIAL

## 2025-10-02 ENCOUNTER — APPOINTMENT (OUTPATIENT)
Dept: PRIMARY CARE | Facility: CLINIC | Age: 65
End: 2025-10-02
Payer: COMMERCIAL